# Patient Record
Sex: FEMALE | Race: WHITE | URBAN - METROPOLITAN AREA
[De-identification: names, ages, dates, MRNs, and addresses within clinical notes are randomized per-mention and may not be internally consistent; named-entity substitution may affect disease eponyms.]

---

## 2018-10-25 ENCOUNTER — APPOINTMENT (OUTPATIENT)
Dept: CT IMAGING | Facility: CLINIC | Age: 58
End: 2018-10-25
Attending: EMERGENCY MEDICINE
Payer: COMMERCIAL

## 2018-10-25 ENCOUNTER — HOSPITAL ENCOUNTER (OUTPATIENT)
Facility: CLINIC | Age: 58
Discharge: HOME | End: 2018-10-26
Attending: EMERGENCY MEDICINE | Admitting: SURGERY
Payer: COMMERCIAL

## 2018-10-25 ENCOUNTER — SURGERY (OUTPATIENT)
Age: 58
End: 2018-10-25

## 2018-10-25 ENCOUNTER — ANESTHESIA EVENT (OUTPATIENT)
Dept: SURGERY | Facility: CLINIC | Age: 58
End: 2018-10-25
Payer: COMMERCIAL

## 2018-10-25 ENCOUNTER — ANESTHESIA (OUTPATIENT)
Dept: SURGERY | Facility: CLINIC | Age: 58
End: 2018-10-25
Payer: COMMERCIAL

## 2018-10-25 DIAGNOSIS — K35.30 ACUTE APPENDICITIS WITH LOCALIZED PERITONITIS, WITHOUT PERFORATION, ABSCESS, OR GANGRENE: ICD-10-CM

## 2018-10-25 LAB
ALBUMIN SERPL-MCNC: 3.6 G/DL (ref 3.4–5)
ALBUMIN UR-MCNC: NEGATIVE MG/DL
ALP SERPL-CCNC: 69 U/L (ref 40–150)
ALT SERPL W P-5'-P-CCNC: 16 U/L (ref 0–50)
ANION GAP SERPL CALCULATED.3IONS-SCNC: 9 MMOL/L (ref 3–14)
APPEARANCE UR: CLEAR
AST SERPL W P-5'-P-CCNC: 11 U/L (ref 0–45)
BASOPHILS # BLD AUTO: 0 10E9/L (ref 0–0.2)
BASOPHILS NFR BLD AUTO: 0.2 %
BILIRUB SERPL-MCNC: 0.5 MG/DL (ref 0.2–1.3)
BILIRUB UR QL STRIP: NEGATIVE
BUN SERPL-MCNC: 10 MG/DL (ref 7–30)
CALCIUM SERPL-MCNC: 9.4 MG/DL (ref 8.5–10.1)
CHLORIDE SERPL-SCNC: 103 MMOL/L (ref 94–109)
CO2 SERPL-SCNC: 27 MMOL/L (ref 20–32)
COLOR UR AUTO: NORMAL
CREAT SERPL-MCNC: 0.62 MG/DL (ref 0.52–1.04)
DIFFERENTIAL METHOD BLD: ABNORMAL
EOSINOPHIL # BLD AUTO: 0 10E9/L (ref 0–0.7)
EOSINOPHIL NFR BLD AUTO: 0.2 %
ERYTHROCYTE [DISTWIDTH] IN BLOOD BY AUTOMATED COUNT: 15.6 % (ref 10–15)
GFR SERPL CREATININE-BSD FRML MDRD: >90 ML/MIN/1.7M2
GLUCOSE SERPL-MCNC: 95 MG/DL (ref 70–99)
GLUCOSE UR STRIP-MCNC: NEGATIVE MG/DL
HCT VFR BLD AUTO: 39.6 % (ref 35–47)
HGB BLD-MCNC: 12.7 G/DL (ref 11.7–15.7)
HGB UR QL STRIP: NEGATIVE
IMM GRANULOCYTES # BLD: 0 10E9/L (ref 0–0.4)
IMM GRANULOCYTES NFR BLD: 0.3 %
KETONES UR STRIP-MCNC: NEGATIVE MG/DL
LEUKOCYTE ESTERASE UR QL STRIP: NEGATIVE
LYMPHOCYTES # BLD AUTO: 1.3 10E9/L (ref 0.8–5.3)
LYMPHOCYTES NFR BLD AUTO: 13.6 %
MCH RBC QN AUTO: 26.9 PG (ref 26.5–33)
MCHC RBC AUTO-ENTMCNC: 32.1 G/DL (ref 31.5–36.5)
MCV RBC AUTO: 84 FL (ref 78–100)
MONOCYTES # BLD AUTO: 0.7 10E9/L (ref 0–1.3)
MONOCYTES NFR BLD AUTO: 7.6 %
NEUTROPHILS # BLD AUTO: 7.3 10E9/L (ref 1.6–8.3)
NEUTROPHILS NFR BLD AUTO: 78.1 %
NITRATE UR QL: NEGATIVE
NRBC # BLD AUTO: 0 10*3/UL
NRBC BLD AUTO-RTO: 0 /100
PH UR STRIP: 6.5 PH (ref 5–7)
PLATELET # BLD AUTO: 191 10E9/L (ref 150–450)
POTASSIUM SERPL-SCNC: 4 MMOL/L (ref 3.4–5.3)
PROT SERPL-MCNC: 8.4 G/DL (ref 6.8–8.8)
RBC # BLD AUTO: 4.72 10E12/L (ref 3.8–5.2)
SODIUM SERPL-SCNC: 139 MMOL/L (ref 133–144)
SOURCE: NORMAL
SP GR UR STRIP: 1.01 (ref 1–1.03)
UROBILINOGEN UR STRIP-MCNC: NORMAL MG/DL (ref 0–2)
WBC # BLD AUTO: 9.3 10E9/L (ref 4–11)

## 2018-10-25 PROCEDURE — 96375 TX/PRO/DX INJ NEW DRUG ADDON: CPT

## 2018-10-25 PROCEDURE — 85025 COMPLETE CBC W/AUTO DIFF WBC: CPT | Performed by: EMERGENCY MEDICINE

## 2018-10-25 PROCEDURE — 25000128 H RX IP 250 OP 636: Performed by: ANESTHESIOLOGY

## 2018-10-25 PROCEDURE — 25000125 ZZHC RX 250: Performed by: NURSE ANESTHETIST, CERTIFIED REGISTERED

## 2018-10-25 PROCEDURE — 37000009 ZZH ANESTHESIA TECHNICAL FEE, EACH ADDTL 15 MIN: Performed by: SURGERY

## 2018-10-25 PROCEDURE — 25000128 H RX IP 250 OP 636: Performed by: EMERGENCY MEDICINE

## 2018-10-25 PROCEDURE — 25800025 ZZH RX 258: Performed by: SURGERY

## 2018-10-25 PROCEDURE — 25000125 ZZHC RX 250: Performed by: SURGERY

## 2018-10-25 PROCEDURE — 74177 CT ABD & PELVIS W/CONTRAST: CPT

## 2018-10-25 PROCEDURE — 27210794 ZZH OR GENERAL SUPPLY STERILE: Performed by: SURGERY

## 2018-10-25 PROCEDURE — 25000132 ZZH RX MED GY IP 250 OP 250 PS 637: Performed by: PHYSICIAN ASSISTANT

## 2018-10-25 PROCEDURE — 40000935 ZZH STATISTIC OUTPATIENT (NON-OBS) EVE

## 2018-10-25 PROCEDURE — 36000056 ZZH SURGERY LEVEL 3 1ST 30 MIN: Performed by: SURGERY

## 2018-10-25 PROCEDURE — 25000566 ZZH SEVOFLURANE, EA 15 MIN: Performed by: SURGERY

## 2018-10-25 PROCEDURE — 25000128 H RX IP 250 OP 636: Performed by: NURSE ANESTHETIST, CERTIFIED REGISTERED

## 2018-10-25 PROCEDURE — 96361 HYDRATE IV INFUSION ADD-ON: CPT | Mod: 59

## 2018-10-25 PROCEDURE — 44970 LAPAROSCOPY APPENDECTOMY: CPT | Mod: AS | Performed by: PHYSICIAN ASSISTANT

## 2018-10-25 PROCEDURE — 25000125 ZZHC RX 250: Performed by: EMERGENCY MEDICINE

## 2018-10-25 PROCEDURE — 88304 TISSUE EXAM BY PATHOLOGIST: CPT | Mod: 26 | Performed by: SURGERY

## 2018-10-25 PROCEDURE — 88304 TISSUE EXAM BY PATHOLOGIST: CPT | Performed by: SURGERY

## 2018-10-25 PROCEDURE — 36000058 ZZH SURGERY LEVEL 3 EA 15 ADDTL MIN: Performed by: SURGERY

## 2018-10-25 PROCEDURE — 99203 OFFICE O/P NEW LOW 30 MIN: CPT | Mod: 57 | Performed by: SURGERY

## 2018-10-25 PROCEDURE — 40000936 ZZH STATISTIC OUTPATIENT (NON-OBS) NIGHT

## 2018-10-25 PROCEDURE — 99285 EMERGENCY DEPT VISIT HI MDM: CPT | Mod: 25

## 2018-10-25 PROCEDURE — 80053 COMPREHEN METABOLIC PANEL: CPT | Performed by: EMERGENCY MEDICINE

## 2018-10-25 PROCEDURE — 37000008 ZZH ANESTHESIA TECHNICAL FEE, 1ST 30 MIN: Performed by: SURGERY

## 2018-10-25 PROCEDURE — 96365 THER/PROPH/DIAG IV INF INIT: CPT

## 2018-10-25 PROCEDURE — 44970 LAPAROSCOPY APPENDECTOMY: CPT | Performed by: SURGERY

## 2018-10-25 PROCEDURE — 71000012 ZZH RECOVERY PHASE 1 LEVEL 1 FIRST HR: Performed by: SURGERY

## 2018-10-25 PROCEDURE — 96376 TX/PRO/DX INJ SAME DRUG ADON: CPT | Mod: 59

## 2018-10-25 PROCEDURE — 81003 URINALYSIS AUTO W/O SCOPE: CPT | Performed by: EMERGENCY MEDICINE

## 2018-10-25 PROCEDURE — 40000169 ZZH STATISTIC PRE-PROCEDURE ASSESSMENT I: Performed by: SURGERY

## 2018-10-25 PROCEDURE — 25000128 H RX IP 250 OP 636: Performed by: PHYSICIAN ASSISTANT

## 2018-10-25 RX ORDER — FENTANYL CITRATE 50 UG/ML
25-50 INJECTION, SOLUTION INTRAMUSCULAR; INTRAVENOUS
Status: DISCONTINUED | OUTPATIENT
Start: 2018-10-25 | End: 2018-10-25 | Stop reason: HOSPADM

## 2018-10-25 RX ORDER — LABETALOL HYDROCHLORIDE 5 MG/ML
20 INJECTION, SOLUTION INTRAVENOUS ONCE
Status: COMPLETED | OUTPATIENT
Start: 2018-10-25 | End: 2018-10-25

## 2018-10-25 RX ORDER — FENTANYL CITRATE 50 UG/ML
INJECTION, SOLUTION INTRAMUSCULAR; INTRAVENOUS PRN
Status: DISCONTINUED | OUTPATIENT
Start: 2018-10-25 | End: 2018-10-25

## 2018-10-25 RX ORDER — NEOSTIGMINE METHYLSULFATE 1 MG/ML
VIAL (ML) INJECTION PRN
Status: DISCONTINUED | OUTPATIENT
Start: 2018-10-25 | End: 2018-10-25

## 2018-10-25 RX ORDER — PROCHLORPERAZINE MALEATE 10 MG
10 TABLET ORAL EVERY 6 HOURS PRN
Status: DISCONTINUED | OUTPATIENT
Start: 2018-10-25 | End: 2018-10-26 | Stop reason: HOSPADM

## 2018-10-25 RX ORDER — DIVALPROEX SODIUM 500 MG/1
1000 TABLET, EXTENDED RELEASE ORAL AT BEDTIME
Status: DISCONTINUED | OUTPATIENT
Start: 2018-10-25 | End: 2018-10-26 | Stop reason: HOSPADM

## 2018-10-25 RX ORDER — LORATADINE 10 MG/1
10 TABLET ORAL EVERY MORNING
COMMUNITY
End: 2023-07-07

## 2018-10-25 RX ORDER — DEXAMETHASONE SODIUM PHOSPHATE 4 MG/ML
INJECTION, SOLUTION INTRA-ARTICULAR; INTRALESIONAL; INTRAMUSCULAR; INTRAVENOUS; SOFT TISSUE PRN
Status: DISCONTINUED | OUTPATIENT
Start: 2018-10-25 | End: 2018-10-25

## 2018-10-25 RX ORDER — PIPERACILLIN SODIUM, TAZOBACTAM SODIUM 3; .375 G/15ML; G/15ML
3.38 INJECTION, POWDER, LYOPHILIZED, FOR SOLUTION INTRAVENOUS ONCE
Status: COMPLETED | OUTPATIENT
Start: 2018-10-25 | End: 2018-10-25

## 2018-10-25 RX ORDER — IOPAMIDOL 755 MG/ML
135 INJECTION, SOLUTION INTRAVASCULAR ONCE
Status: COMPLETED | OUTPATIENT
Start: 2018-10-25 | End: 2018-10-25

## 2018-10-25 RX ORDER — HYDROMORPHONE HYDROCHLORIDE 1 MG/ML
0.2 INJECTION, SOLUTION INTRAMUSCULAR; INTRAVENOUS; SUBCUTANEOUS
Status: DISCONTINUED | OUTPATIENT
Start: 2018-10-25 | End: 2018-10-26 | Stop reason: HOSPADM

## 2018-10-25 RX ORDER — GLYCOPYRROLATE 0.2 MG/ML
INJECTION, SOLUTION INTRAMUSCULAR; INTRAVENOUS PRN
Status: DISCONTINUED | OUTPATIENT
Start: 2018-10-25 | End: 2018-10-25

## 2018-10-25 RX ORDER — OXYCODONE HYDROCHLORIDE 5 MG/1
5-10 TABLET ORAL
Status: DISCONTINUED | OUTPATIENT
Start: 2018-10-25 | End: 2018-10-26 | Stop reason: HOSPADM

## 2018-10-25 RX ORDER — TRAZODONE HYDROCHLORIDE 50 MG/1
50 TABLET, FILM COATED ORAL AT BEDTIME
COMMUNITY

## 2018-10-25 RX ORDER — DIVALPROEX SODIUM 500 MG/1
1000 TABLET, EXTENDED RELEASE ORAL AT BEDTIME
COMMUNITY

## 2018-10-25 RX ORDER — SODIUM CHLORIDE, SODIUM LACTATE, POTASSIUM CHLORIDE, CALCIUM CHLORIDE 600; 310; 30; 20 MG/100ML; MG/100ML; MG/100ML; MG/100ML
INJECTION, SOLUTION INTRAVENOUS CONTINUOUS
Status: DISCONTINUED | OUTPATIENT
Start: 2018-10-25 | End: 2018-10-25 | Stop reason: HOSPADM

## 2018-10-25 RX ORDER — ONDANSETRON 2 MG/ML
4 INJECTION INTRAMUSCULAR; INTRAVENOUS EVERY 30 MIN PRN
Status: DISCONTINUED | OUTPATIENT
Start: 2018-10-25 | End: 2018-10-25

## 2018-10-25 RX ORDER — SODIUM CHLORIDE 9 MG/ML
INJECTION, SOLUTION INTRAVENOUS CONTINUOUS
Status: DISCONTINUED | OUTPATIENT
Start: 2018-10-25 | End: 2018-10-25 | Stop reason: CLARIF

## 2018-10-25 RX ORDER — SERTRALINE HYDROCHLORIDE 100 MG/1
200 TABLET, FILM COATED ORAL DAILY
COMMUNITY

## 2018-10-25 RX ORDER — HYDROMORPHONE HYDROCHLORIDE 1 MG/ML
0.5 INJECTION, SOLUTION INTRAMUSCULAR; INTRAVENOUS; SUBCUTANEOUS
Status: COMPLETED | OUTPATIENT
Start: 2018-10-25 | End: 2018-10-25

## 2018-10-25 RX ORDER — HYDROXYZINE HYDROCHLORIDE 25 MG/1
25 TABLET, FILM COATED ORAL EVERY 6 HOURS PRN
Status: DISCONTINUED | OUTPATIENT
Start: 2018-10-25 | End: 2018-10-26 | Stop reason: HOSPADM

## 2018-10-25 RX ORDER — ONDANSETRON 2 MG/ML
INJECTION INTRAMUSCULAR; INTRAVENOUS PRN
Status: DISCONTINUED | OUTPATIENT
Start: 2018-10-25 | End: 2018-10-25

## 2018-10-25 RX ORDER — ONDANSETRON 4 MG/1
4 TABLET, ORALLY DISINTEGRATING ORAL EVERY 30 MIN PRN
Status: DISCONTINUED | OUTPATIENT
Start: 2018-10-25 | End: 2018-10-25 | Stop reason: HOSPADM

## 2018-10-25 RX ORDER — TRAZODONE HYDROCHLORIDE 50 MG/1
50 TABLET, FILM COATED ORAL AT BEDTIME
Status: DISCONTINUED | OUTPATIENT
Start: 2018-10-25 | End: 2018-10-26 | Stop reason: HOSPADM

## 2018-10-25 RX ORDER — NALOXONE HYDROCHLORIDE 0.4 MG/ML
.1-.4 INJECTION, SOLUTION INTRAMUSCULAR; INTRAVENOUS; SUBCUTANEOUS
Status: CANCELLED | OUTPATIENT
Start: 2018-10-25 | End: 2018-10-26

## 2018-10-25 RX ORDER — OXYCODONE HYDROCHLORIDE 5 MG/1
5-10 TABLET ORAL EVERY 6 HOURS PRN
Qty: 15 TABLET | Refills: 0 | Status: SHIPPED | OUTPATIENT
Start: 2018-10-25 | End: 2023-07-07

## 2018-10-25 RX ORDER — ONDANSETRON 4 MG/1
4 TABLET, ORALLY DISINTEGRATING ORAL EVERY 6 HOURS PRN
Status: DISCONTINUED | OUTPATIENT
Start: 2018-10-25 | End: 2018-10-26 | Stop reason: HOSPADM

## 2018-10-25 RX ORDER — HYDROMORPHONE HYDROCHLORIDE 1 MG/ML
.3-.5 INJECTION, SOLUTION INTRAMUSCULAR; INTRAVENOUS; SUBCUTANEOUS EVERY 5 MIN PRN
Status: DISCONTINUED | OUTPATIENT
Start: 2018-10-25 | End: 2018-10-25 | Stop reason: HOSPADM

## 2018-10-25 RX ORDER — LIDOCAINE 40 MG/G
CREAM TOPICAL
Status: DISCONTINUED | OUTPATIENT
Start: 2018-10-25 | End: 2018-10-26 | Stop reason: HOSPADM

## 2018-10-25 RX ORDER — ONDANSETRON 2 MG/ML
4 INJECTION INTRAMUSCULAR; INTRAVENOUS EVERY 6 HOURS PRN
Status: DISCONTINUED | OUTPATIENT
Start: 2018-10-25 | End: 2018-10-26 | Stop reason: HOSPADM

## 2018-10-25 RX ORDER — NALOXONE HYDROCHLORIDE 0.4 MG/ML
.1-.4 INJECTION, SOLUTION INTRAMUSCULAR; INTRAVENOUS; SUBCUTANEOUS
Status: DISCONTINUED | OUTPATIENT
Start: 2018-10-25 | End: 2018-10-26 | Stop reason: HOSPADM

## 2018-10-25 RX ORDER — MAGNESIUM HYDROXIDE 1200 MG/15ML
LIQUID ORAL PRN
Status: DISCONTINUED | OUTPATIENT
Start: 2018-10-25 | End: 2018-10-25 | Stop reason: HOSPADM

## 2018-10-25 RX ORDER — DIVALPROEX SODIUM 125 MG/1
125 TABLET, DELAYED RELEASE ORAL 3 TIMES DAILY
COMMUNITY
End: 2018-10-25

## 2018-10-25 RX ORDER — ONDANSETRON 2 MG/ML
4 INJECTION INTRAMUSCULAR; INTRAVENOUS EVERY 30 MIN PRN
Status: DISCONTINUED | OUTPATIENT
Start: 2018-10-25 | End: 2018-10-25 | Stop reason: HOSPADM

## 2018-10-25 RX ADMIN — ROCURONIUM BROMIDE 20 MG: 10 INJECTION INTRAVENOUS at 14:32

## 2018-10-25 RX ADMIN — WATER 1000 ML: 100 IRRIGANT IRRIGATION at 14:48

## 2018-10-25 RX ADMIN — ONDANSETRON 4 MG: 2 INJECTION INTRAMUSCULAR; INTRAVENOUS at 09:33

## 2018-10-25 RX ADMIN — SODIUM CHLORIDE: 9 INJECTION, SOLUTION INTRAVENOUS at 17:23

## 2018-10-25 RX ADMIN — HYDROMORPHONE HYDROCHLORIDE 0.25 MG: 1 INJECTION, SOLUTION INTRAMUSCULAR; INTRAVENOUS; SUBCUTANEOUS at 14:37

## 2018-10-25 RX ADMIN — SODIUM CHLORIDE, PRESERVATIVE FREE 80 ML: 5 INJECTION INTRAVENOUS at 11:32

## 2018-10-25 RX ADMIN — LABETALOL HYDROCHLORIDE 20 MG: 5 INJECTION INTRAVENOUS at 15:55

## 2018-10-25 RX ADMIN — ROCURONIUM BROMIDE 30 MG: 10 INJECTION INTRAVENOUS at 14:16

## 2018-10-25 RX ADMIN — DEXMEDETOMIDINE HYDROCHLORIDE 8 MCG: 100 INJECTION, SOLUTION INTRAVENOUS at 14:37

## 2018-10-25 RX ADMIN — SODIUM CHLORIDE, POTASSIUM CHLORIDE, SODIUM LACTATE AND CALCIUM CHLORIDE: 600; 310; 30; 20 INJECTION, SOLUTION INTRAVENOUS at 14:10

## 2018-10-25 RX ADMIN — DEXMEDETOMIDINE HYDROCHLORIDE 8 MCG: 100 INJECTION, SOLUTION INTRAVENOUS at 14:31

## 2018-10-25 RX ADMIN — FENTANYL CITRATE 100 MCG: 50 INJECTION, SOLUTION INTRAMUSCULAR; INTRAVENOUS at 14:15

## 2018-10-25 RX ADMIN — MIDAZOLAM 2 MG: 1 INJECTION INTRAMUSCULAR; INTRAVENOUS at 14:11

## 2018-10-25 RX ADMIN — SODIUM CHLORIDE 1000 ML: 900 IRRIGANT IRRIGATION at 14:47

## 2018-10-25 RX ADMIN — HYDROMORPHONE HYDROCHLORIDE 0.25 MG: 1 INJECTION, SOLUTION INTRAMUSCULAR; INTRAVENOUS; SUBCUTANEOUS at 14:46

## 2018-10-25 RX ADMIN — SODIUM CHLORIDE 1000 ML: 9 INJECTION, SOLUTION INTRAVENOUS at 09:36

## 2018-10-25 RX ADMIN — PIPERACILLIN SODIUM, TAZOBACTAM SODIUM 3.38 G: 3; .375 INJECTION, POWDER, LYOPHILIZED, FOR SOLUTION INTRAVENOUS at 12:24

## 2018-10-25 RX ADMIN — FENTANYL CITRATE 100 MCG: 50 INJECTION, SOLUTION INTRAMUSCULAR; INTRAVENOUS at 14:13

## 2018-10-25 RX ADMIN — DEXAMETHASONE SODIUM PHOSPHATE 10 MG: 4 INJECTION, SOLUTION INTRA-ARTICULAR; INTRALESIONAL; INTRAMUSCULAR; INTRAVENOUS; SOFT TISSUE at 14:30

## 2018-10-25 RX ADMIN — IOPAMIDOL 135 ML: 755 INJECTION, SOLUTION INTRAVENOUS at 11:32

## 2018-10-25 RX ADMIN — ONDANSETRON 4 MG: 2 INJECTION INTRAMUSCULAR; INTRAVENOUS at 14:30

## 2018-10-25 RX ADMIN — BUPIVACAINE HYDROCHLORIDE AND EPINEPHRINE BITARTRATE 38 ML: 5; .005 INJECTION, SOLUTION EPIDURAL; INTRACAUDAL; PERINEURAL at 15:03

## 2018-10-25 RX ADMIN — Medication 0.5 MG: at 12:22

## 2018-10-25 RX ADMIN — GLYCOPYRROLATE 0.8 MG: 0.2 INJECTION, SOLUTION INTRAMUSCULAR; INTRAVENOUS at 15:12

## 2018-10-25 RX ADMIN — NEOSTIGMINE METHYLSULFATE 5 MG: 1 INJECTION, SOLUTION INTRAVENOUS at 15:12

## 2018-10-25 RX ADMIN — Medication 0.5 MG: at 09:34

## 2018-10-25 RX ADMIN — DIVALPROEX SODIUM 1000 MG: 500 TABLET, EXTENDED RELEASE ORAL at 21:12

## 2018-10-25 RX ADMIN — DEXMEDETOMIDINE HYDROCHLORIDE 4 MCG: 100 INJECTION, SOLUTION INTRAVENOUS at 14:45

## 2018-10-25 ASSESSMENT — ENCOUNTER SYMPTOMS
ABDOMINAL DISTENTION: 1
DIARRHEA: 0
FEVER: 0
VOMITING: 0
FREQUENCY: 0
CONSTIPATION: 0
NAUSEA: 1
ABDOMINAL PAIN: 1
DYSURIA: 0

## 2018-10-25 ASSESSMENT — ACTIVITIES OF DAILY LIVING (ADL)
TOILETING: 0-->INDEPENDENT
RETIRED_EATING: 0-->INDEPENDENT
COGNITION: 0 - NO COGNITION ISSUES REPORTED
BATHING: 0-->INDEPENDENT
SWALLOWING: 0-->SWALLOWS FOODS/LIQUIDS WITHOUT DIFFICULTY
FALL_HISTORY_WITHIN_LAST_SIX_MONTHS: NO
AMBULATION: 0-->INDEPENDENT
DRESS: 0-->INDEPENDENT
RETIRED_COMMUNICATION: 0-->UNDERSTANDS/COMMUNICATES WITHOUT DIFFICULTY
TRANSFERRING: 0-->INDEPENDENT

## 2018-10-25 ASSESSMENT — LIFESTYLE VARIABLES: TOBACCO_USE: 0

## 2018-10-25 NOTE — OP NOTE
General Surgery Operative Note    PREOPERATIVE DIAGNOSIS:  ACUTE APPENDICITIS.    POSTOPERATIVE DIAGNOSIS:  Acute appendicitis with localized peritonitis    PROCEDURE:   Procedure(s):  LAPAROSCOPIC APPENDECTOMY    ANESTHESIA:  General.    PREOPERATIVE MEDICATIONS:  Zosyn IV.    SURGEON:  Danilo Jennings MD    ASSISTANT:  Timoteo Sandoval PA-C.  First assistant was necessary due to challenging exposure and the need for improved visualization and help maintaining hemostasis.    INDICATIONS:  Appendicitis    PROCEDURE:  Patient was taken to the operating suite and uneventfully endotracheally intubated.  Abdomen was prepped and draped in a sterile fashion.  Surgeon initiated timeout was acknowledged.  A small skin incision was made left upper quadrant.  We entered the abdomen using the Visiport technique.  Two other trocars were placed in the usual positions under laparoscopic visualization.  Using 2 long graspers, we were able to identify the cecum and the appendix was identified near the ileocecal valve.  It was in a retrocecal position.  The appendix was inflamed and hyperemic but not ruptured.   We were able to grasp the appendix and elevate it up toward the anterior abdominal wall.  Using a combination of sharp and blunt dissection, we were able to create a window between the appendix and the mesoappendix near its base.  We then fired a 45 mm blue load Endo-TONJA stapler across the appendix at its base.  This appeared to be intact.  Following this, we used the ligasure device across the mesoappendix, freeing the appendix from the cecum.  Appendix was placed in an Endocatch bag and removed from the abdomen.  We again inspected our staple lines and these were all found to be intact and hemostatic.  I removed the umbilical port trocar and reapproximated the fascia with a figure-of-eight 0 Vicryl suture using the Osmany-Amy device.  We then desufflated the abdomen using the Logan suction  and the trocars were  removed.  The skin edges were reapproximated using 4-0 Vicryl and Steri-Strips.  Band-Aids were placed and the patient was awakened.  The patient was uneventfully extubated and taken to PACU in stable condition.  At the conclusion of the case, all lap and needle counts were correct.      ESTIMATED BLOOD LOSS:  10 mL    INTRAOPERATIVE FINDINGS:  Acute appendicitis with localized peritonitis    Danilo Jennings MD

## 2018-10-25 NOTE — IP AVS SNAPSHOT
MRN:[id number]                      After Visit Summary   10/25/2018    Sherrie Swanson Behboudi    MRN: [id number]           Thank you!     Thank you for choosing [facility] for your care. Our goal is always to provide you with excellent care. Hearing back from our patients is one way we can continue to improve our services. Please take a few minutes to complete the written survey that you may receive in the mail after you visit with us. Thank you!        Patient Information     Date Of Birth          1960        Designated Caregiver       Most Recent Value    Caregiver    Will someone help with your care after discharge? yes    Name of designated caregiver Lopez    Phone number of caregiver [phone]    Caregiver address Same as pt      About your hospital stay     You were admitted on:  October 25, 2018 You last received care in the:  [facility] Observation Unit    You were discharged on:  October 26, 2018       Who to Call     For medical emergencies, please call 911.  For non-urgent questions about your medical care, please call your primary care provider or clinic, [phone]  For questions related to your surgery, please call your surgery clinic        Attending Provider     Provider Specialty    Iva Pepper MD Emergency Medicine    Danilo Jennings MD Surgery       Primary Care Provider Office Phone # Fax #    [facility] [phone]      Pending Results     Date and Time Order Name Status Description    10/25/2018 1456 Surgical pathology exam In process             Statement of Approval     Ordered          10/26/18 1306  I have reviewed and agree with all the recommendations and orders detailed in this document.  EFFECTIVE NOW     Approved and electronically signed by:  Timoteo Sandoval PA-C             Admission Information     Date & Time Provider Department Dept. Phone    10/25/2018 Danilo Jennings MD [facility]  "Unit [phone]      Your Vitals Were     Blood Pressure Pulse Temperature Respirations Height Weight    140/65 (BP Location: Right arm) 83 98  F (36.7  C) (Oral) 15 1.737 m (5' 8.4\") 138.8 kg (306 lb)    Pulse Oximetry BMI (Body Mass Index)                94% 45.98 kg/m2          MyChart Information     [facility] lets you send messages to your doctor, view your test results, renew your prescriptions, schedule appointments and more. To sign up, go to [email or web address] . Click on \"Log in\" on the left side of the screen, which will take you to the Welcome page. Then click on \"Sign up Now\" on the right side of the page.     You will be asked to enter the access code listed below, as well as some personal information. Please follow the directions to create your username and password.     Your access code is: [email or web address]  Expires: 2019  1:19 PM     Your access code will  in 90 days. If you need help or a new code, please call your [facility] clinic or [phone].        Care EveryWhere ID     This is your Care EveryWhere ID. This could be used by other organizations to access your [facility] medical records  [id number]        Equal Access to Services     BERNADETTE DOMINIQUE AH: Sandy howeo Sotere, waaxda luqadaha, qaybta kaalmada adeegyada, thelma pichardo. So [facility] [phone].    ATENCIÓN: Si habla español, tiene a desouza disposición servicios gratuitos de asistencia lingüística. Llame al [phone].    We comply with applicable federal civil rights laws and [place] laws. We do not discriminate on the basis of race, color, national origin, age, disability, sex, sexual orientation, or gender identity.               Review of your medicines      START taking        Dose / Directions    ondansetron 4 MG ODT tab   Commonly known as:  ZOFRAN-ODT   Used for:  Acute appendicitis with localized peritonitis, without perforation, abscess, or gangrene        Dose:  4 mg   Take 1 " tablet (4 mg) by mouth every 6 hours as needed for nausea or vomiting   Quantity:  20 tablet   Refills:  0       oxyCODONE IR 5 MG tablet   Commonly known as:  ROXICODONE   Used for:  Acute appendicitis with localized peritonitis, without perforation, abscess, or gangrene   Notes to Patient:  Available anytime after 3pm on 10/26/18        Dose:  5-10 mg   Take 1-2 tablets (5-10 mg) by mouth every 6 hours as needed for pain (Moderate to Severe)   Quantity:  15 tablet   Refills:  0         CONTINUE these medicines which have NOT CHANGED        Dose / Directions    divalproex sodium extended-release 500 MG 24 hr tablet   Commonly known as:  DEPAKOTE ER        Dose:  1000 mg   Take 1,000 mg by mouth At Bedtime   Refills:  0       loratadine 10 MG tablet   Commonly known as:  CLARITIN   Indication:  Hayfever   Notes to Patient:  Resume per pt        Dose:  10 mg   Take 10 mg by mouth every morning   Refills:  0       sertraline 100 MG tablet   Commonly known as:  ZOLOFT        Dose:  200 mg   Take 200 mg by mouth daily   Refills:  0       traZODone 50 MG tablet   Commonly known as:  DESYREL        Dose:  50 mg   Take 50 mg by mouth At Bedtime   Refills:  0            Where to get your medicines      These medications were sent to [facility] [place], [place] - [address]  [id number] [place] [address]     Phone:  [phone]     ondansetron 4 MG ODT tab         Some of these will need a paper prescription and others can be bought over the counter. Ask your nurse if you have questions.     Bring a paper prescription for each of these medications     oxyCODONE IR 5 MG tablet                Protect others around you: Learn how to safely use, store and throw away your medicines at www.disposemymeds.org.        Information about OPIOIDS     PRESCRIPTION OPIOIDS: WHAT YOU NEED TO KNOW   We gave you an opioid (narcotic) pain medicine. It is important to manage your pain, but opioids are  not always the best choice. You should first try all the other options your care team gave you. Take this medicine for as short a time (and as few doses) as possible.    Some activities can increase your pain, such as bandage changes or therapy sessions. It may help to take your pain medicine 30 to 60 minutes before these activities. Reduce your stress by getting enough sleep, working on hobbies you enjoy and practicing relaxation or meditation. Talk to your care team about ways to manage your pain beyond prescription opioids.    These medicines have risks:    DO NOT drive when on new or higher doses of pain medicine. These medicines can affect your alertness and reaction times, and you could be arrested for driving under the influence (DUI). If you need to use opioids long-term, talk to your care team about driving.    DO NOT operate heavy machinery    DO NOT do any other dangerous activities while taking these medicines.    DO NOT drink any alcohol while taking these medicines.     If the opioid prescribed includes acetaminophen, DO NOT take with any other medicines that contain acetaminophen. Read all labels carefully. Look for the word  acetaminophen  or  Tylenol.  Ask your pharmacist if you have questions or are unsure.    You can get addicted to pain medicines, especially if you have a history of addiction (chemical, alcohol or substance dependence). Talk to your care team about ways to reduce this risk.    All opioids tend to cause constipation. Drink plenty of water and eat foods that have a lot of fiber, such as fruits, vegetables, prune juice, apple juice and high-fiber cereal. Take a laxative (Miralax, milk of magnesia, Colace, Senna) if you don t move your bowels at least every other day. Other side effects include upset stomach, sleepiness, dizziness, throwing up, tolerance (needing more of the medicine to have the same effect), physical dependence and slowed breathing.    Store your pills in a secure  place, locked if possible. We will not replace any lost or stolen medicine. If you don t finish your medicine, please throw away (dispose) as directed by your pharmacist. The [place] Pollution Control Agency has more information about safe disposal: https://www.pca.[facility].[place].us/living-green/managing-unwanted-medications             Medication List: This is a list of all your medications and when to take them. Check marks below indicate your daily home schedule. Keep this list as a reference.      Medications           Morning Afternoon Evening Bedtime As Needed    divalproex sodium extended-release 500 MG 24 hr tablet   Commonly known as:  DEPAKOTE ER   Take 1,000 mg by mouth At Bedtime   Last time this was given:  1,000 mg on 10/25/2018  9:12 PM                        10/26/18           loratadine 10 MG tablet   Commonly known as:  CLARITIN   Take 10 mg by mouth every morning   Notes to Patient:  Resume per pt                                ondansetron 4 MG ODT tab   Commonly known as:  ZOFRAN-ODT   Take 1 tablet (4 mg) by mouth every 6 hours as needed for nausea or vomiting                                   oxyCODONE IR 5 MG tablet   Commonly known as:  ROXICODONE   Take 1-2 tablets (5-10 mg) by mouth every 6 hours as needed for pain (Moderate to Severe)   Last time this was given:  5 mg on 10/26/2018  8:54 AM   Notes to Patient:  Available anytime after 3pm on 10/26/18                            Available anytime after 3 pm on 10/26/18       sertraline 100 MG tablet   Commonly known as:  ZOLOFT   Take 200 mg by mouth daily   Last time this was given:  200 mg on 10/26/2018  7:56 AM            10/27/18                       traZODone 50 MG tablet   Commonly known as:  DESYREL   Take 50 mg by mouth At Bedtime   Last time this was given:  50 mg on 10/26/2018  2:23 AM                        10/26/18

## 2018-10-25 NOTE — ANESTHESIA CARE TRANSFER NOTE
Patient: Sherrie Swanson Behboudi    Procedure(s):  LAPAROSCOPIC APPENDECTOMY    Diagnosis: ACUTE APPENDICITIS.  Diagnosis Additional Information: No value filed.    Anesthesia Type:   General, RSI, ETT     Note:  Airway :Face Mask  Patient transferred to:PACU  Comments: Pt to PACU on O2 via mask, airway patent, VSS.  Report to RN.Handoff Report: Identifed the Patient, Identified the Reponsible Provider, Reviewed the pertinent medical history, Discussed the surgical course, Reviewed Intra-OP anesthesia mangement and issues during anesthesia, Set expectations for post-procedure period and Allowed opportunity for questions and acknowledgement of understanding      Vitals: (Last set prior to Anesthesia Care Transfer)    CRNA VITALS  10/25/2018 1452 - 10/25/2018 1529      10/25/2018             Pulse: 97    SpO2: 94 %    Resp Rate (set): 10                Electronically Signed By: MILLIE Enamorado CRNA  October 25, 2018  3:29 PM

## 2018-10-25 NOTE — IP AVS SNAPSHOT
[facility] Observation Unit    [address] [place] [id number]    Phone:  [phone]                                       After Visit Summary   10/25/2018    Sherrie Swanson Behboudi    MRN: [id number]           After Visit Summary Signature Page     I have received my discharge instructions, and my questions have been answered. I have discussed any challenges I see with this plan with the nurse or doctor.    ..........................................................................................................................................  Patient/Patient Representative Signature      ..........................................................................................................................................  Patient Representative Print Name and Relationship to Patient    ..................................................               ................................................  Date                                   Time    ..........................................................................................................................................  Reviewed by Signature/Title    ...................................................              ..............................................  Date                                               Time          22EPIC Rev 08/18

## 2018-10-25 NOTE — H&P
"[facility] - H&P     Sherrie Swanson Behboudi MRN# [id number]   Age: 58 year old YOB: 1960     HPI:  Patient has been experiencing acute suprapubic and RLQ abdominal pain for the past 4 days associated with nausea and anorexia.  These symptoms have been increasing in severity.  Pain originally started on Friday and was intermittent.  It extend across her lower abdomen.  Yesterday the pain became much more severe and was localized to the right lower quadrant.  This progressed overnight and the patient had very severe pain this morning and presented to the emergency department.  CT scan was ordered and this showed inflammatory changes in the right lower quadrant and an enlarged thickened appendix.  No obvious signs of abscess or appendix rupture.  Patient has no history of previous abdominal surgeries.  No loose stools or madiha emesis.  We are asked to help evaluate.    History is obtained from the patient    Review Of Systems:  Respiratory: No shortness of breath, dyspnea on exertion, cough, or hemoptysis  Cardiovascular: negative  Gastrointestinal: as above  Genitourinary: negative    PMH:  History reviewed. No pertinent past medical history.    PSH:  History reviewed. No pertinent surgical history.    Allergies:  Allergies   Allergen Reactions     Dust Mites      Penicillins Unknown       Home Medications:  No current outpatient prescriptions on file.       Social History:  Social History   Substance Use Topics     Smoking status: Never Smoker     Smokeless tobacco: Never Used     Alcohol use Yes       Family History:  No family history chronic diarrhea, inflammatory bowel disease or colon cancer.    Objective:  /75  Pulse 83  Temp 99.1  F (37.3  C) (Oral)  Resp 18  Ht 1.737 m (5' 8.4\")  Wt 138.8 kg (306 lb)  SpO2 96%  BMI 45.98 kg/m2    General appearance: healthy, alert and mild distress  Hydration: mildly dehydrated  Neck: normal, supple and no " adenopathy  Lungs: normal and clear to auscultation  Heart: regular rate and rhythm and no murmurs, clicks, or gallops  Abdomen: obese, hypoactive bowel sounds.   Tenderness: present: RLQ moderate  Masses: none  Organomegaly: none    Labs Reviewed:  Recent Labs      10/25/18   0920   HGB  12.7   WBC  9.3       Radiology:  CT abdomen reveals a dilated, thick-walled appendix with surrounding fat stranding.  Some surrounding fluid.  No free air or evidence for rupture.  All imaging studies reviewed by me.    ASSESSMENT/PLAN:  The patient's history, physical exam, laboratory and imaging studies are suspicious for acute appendicitis.  I have offered the patient laparoscopic appendectomy.  The risks, benefits, and alternatives have been discussed in detail.  All of the patient's questions have been answered.  They elect to proceed and we will go to the OR at the soonest availability.  Pre-operative antibiotics have been ordered.     Danilo Jennings MD

## 2018-10-25 NOTE — ED PROVIDER NOTES
"  History     Chief Complaint:  Abdominal Pain    HPI   Sherrie Swanson Behboudi is a 58 year old female who presents for evaluation of abdominal pain. Her pain began 6 days ago, and was reported to be sporadic shooting pain. She was not doing any physically straining activity involving her abdominal muscles. Yesterday, the pain became worse, and she now feels that she is bloated, and has sharp pain with movement. She states she has been unable to sleep laying down and has been sleeping upright in a chair. She is nauseous, but has not been vomiting. Denies dysuria, or other changes in urination. No fevers. Denies any problems with her BM's. No recent abdominal surgeries. Found a few polyps on her recent colonoscopy. Her last meal was yesterday, and some water this morning with her medications.    Allergies:  Dust Mites    Medications:    Robitussin  Sertraline     Past Medical History:    The patient denies any significant past medical history.     Past Surgical History:    The patient does not have any pertinent past surgical history.     Family History:    No past pertinent family history.     Social History:  Relationship status:   Tobacco use: No  Alcohol use: Yes  The patient presents with family.    Marital Status:   [2]     Review of Systems   Constitutional: Negative for fever.   Gastrointestinal: Positive for abdominal distention, abdominal pain and nausea. Negative for constipation, diarrhea and vomiting.   Genitourinary: Negative for decreased urine volume, dysuria, frequency and urgency.   All other systems reviewed and are negative.    Physical Exam     Patient Vitals for the past 24 hrs:   BP Temp Temp src Pulse Resp SpO2 Height Weight   10/25/18 1100 152/72 - - - - 98 % - -   10/25/18 1030 151/69 - - 100 16 100 % - -   10/25/18 1000 148/63 - - 99 18 99 % - -   10/25/18 0926 151/77 99.1  F (37.3  C) Oral 83 16 97 % 1.737 m (5' 8.4\") 138.8 kg (306 lb)        Physical Exam  General: Patient " is alert and uncomfortable appearing.  HEENT: Head atraumatic    Eyes: pupils equal and reactive. Conjunctiva clear   Nares: patent   Oropharynx: no lesions, uvula midline, no palatal draping, normal voice, no trismus  Neck: Supple without lymphadenopathy, no meningismus  Chest: Heart regular rate and rhythm.   Lungs: Equal clear to auscultation with no wheeze or rales  Abdomen: Soft, tender greatest in the right lower quadrant and also periumbilically.  Not distended.  Positive rebound, positive guarding.  Back: No costovertebral angle tenderness, no midline C, T or L spine tenderness  Neuro: Grossly nonfocal, normal speech, strength equal bilaterally, CN 2-12 intact  Extremities: No deformities, equal radial and DP pulses. No clubbing, cyanosis.  No edema  Skin: Warm and dry with no rash.       Emergency Department Course     Imaging:  Radiology findings were communicated with the patient who voiced understanding of the findings.  CT Abdomen Pelvis w Contrast  IMPRESSION: Acute uncomplicated appendicitis. Recommend surgical  consult.  Reading per radiology.     Laboratory:  Laboratory findings were communicated with the patient who voiced understanding of the findings.  CBC: RDW: 15.6(H) o/w WNL (WBC 9.3, HGB 12.7, )  CMP: AWNL (Creatinine 0.62)   UA reflex to microscopic and culture: AWNL    Interventions:  0933 Zofran 4 mg IV  0934 Dilaudid 0.5 mg IV  0936 Normal Saline 1000 mL IV   1222 Dilaudid 0.5 mg IV  1224 Zosyn 3.375 g IV  Medications   ondansetron (ZOFRAN) injection 4 mg (4 mg Intravenous Given 10/25/18 0933)   piperacillin-tazobactam (ZOSYN) 3.375 g vial to attach to  mL bag (3.375 g Intravenous New Bag 10/25/18 1224)   0.9% sodium chloride BOLUS (0 mLs Intravenous Stopped 10/25/18 1132)   HYDROmorphone (PF) (DILAUDID) injection 0.5 mg (0.5 mg Intravenous Given 10/25/18 0934)   iopamidol (ISOVUE-370) solution 135 mL (135 mLs Intravenous Given 10/25/18 1132)   Saline (80 mLs As instructed  Given 10/25/18 1132)   HYDROmorphone (PF) (DILAUDID) injection 0.5 mg (0.5 mg Intravenous Given 10/25/18 1222)      Emergency Department Course:  Nursing notes and vitals reviewed. IV was inserted and blood was drawn for laboratory testing, results above. The patient was sent for a CT while in the emergency department, results above.    0919: I performed an exam of the patient as documented above.     I discussed the treatment plan with the patient. They expressed understanding of this plan and consented to admission. I discussed the patient with Dr. Jennings, who will admit the patient to the OR for further treatment.     I personally reviewed the laboratory results with the patient and answered all related questions prior to admission.    Impression & Plan      Medical Decision Making:  Sherrie Swanson Behboudi is a 58 year old female who presents with abdominal pain and the CT scan confirms appendicitis.  There is no evidence of rupture or abscess at this time. Pain has been controlled with interventions in the Emergency Department.  Parenteral antibiotics have been ordered and given in the Emergency Department. The case was discussed with the on-call surgeon and the patient will be going to the operating room.  An observation bed has been arranged for after surgery.  Patient is hemodynamically stable in ED.  Questions were answered.      Diagnosis:    ICD-10-CM    1. Acute appendicitis with localized peritonitis, without perforation, abscess, or gangrene K35.30       Disposition:   Admitted to OR    CMS Diagnoses: None     Scribe Disclosure:  ISujey, am serving as a scribe at 9:13 AM on 10/25/2018 to document services personally performed by Iva Pepper MD, based on my observations and the provider's statements to me.     Sujey Frazier  10/25/2018    EMERGENCY DEPARTMENT       Iva Pepper MD  10/25/18 1244       Iva Pepper MD  10/25/18 1252

## 2018-10-25 NOTE — ED NOTES
[facility]  ED Nurse Handoff Report    ED Chief complaint: Abdominal Pain (abd pain since friday. +nausea. No vomiting or diarrhea)      ED Diagnosis:   Final diagnoses:   Acute appendicitis with localized peritonitis, without perforation, abscess, or gangrene       Code Status: Full Code    Allergies:   Allergies   Allergen Reactions     Dust Mites        Activity level - Baseline/Home:  Independent    Activity Level - Current:   Independent     Needed?: No    Isolation: No  Infection: Not Applicable  Bariatric?: No    Vital Signs:   Vitals:    10/25/18 1030 10/25/18 1100 10/25/18 1145 10/25/18 1200   BP: 151/69 152/72 149/72 144/64   Pulse: 100  83 86   Resp: 16 18 16 16   Temp:       TempSrc:       SpO2: 100% 98% 96% 93%   Weight:       Height:           Cardiac Rhythm: ,        Pain level: 0-10 Pain Scale: 5    Is this patient confused?: No   Randall - Suicide Severity Rating Scale Completed?  Yes  If yes, what color did the patient score?  White    Patient Report: Initial Complaint: pt with generalized abd pain past few days today localied rlq denies vomiting  Focused Assessment: pain llq pt has appendicitis  Tests Performed: labs ct scan  Abnormal Results: ct scan appendicitis  Treatments provided: zosyn given, 1 liter of fluid and dilaudid 0.5 X2    Family Comments: daughter at bedside    OBS brochure/video discussed/provided to patient/family: Yes              Name of person given brochure if not patient:               Relationship to patient:     ED Medications:   Medications   ondansetron (ZOFRAN) injection 4 mg (4 mg Intravenous Given 10/25/18 0933)   0.9% sodium chloride BOLUS (0 mLs Intravenous Stopped 10/25/18 1132)   HYDROmorphone (PF) (DILAUDID) injection 0.5 mg (0.5 mg Intravenous Given 10/25/18 0934)   iopamidol (ISOVUE-370) solution 135 mL (135 mLs Intravenous Given 10/25/18 1132)   Saline (80 mLs As instructed Given 10/25/18 1132)   piperacillin-tazobactam (ZOSYN)  3.375 g vial to attach to  mL bag (3.375 g Intravenous New Bag 10/25/18 1224)   HYDROmorphone (PF) (DILAUDID) injection 0.5 mg (0.5 mg Intravenous Given 10/25/18 1222)       Drips infusing?:  No    For the majority of the shift this patient was Green.   Interventions performed were rounds and suppoort.    Severe Sepsis OR Septic Shock Diagnosis Present: No    To be done/followed up on inpatient unit:  none  ED NURSE PHONE NUMBER: [phone]

## 2018-10-25 NOTE — ANESTHESIA PREPROCEDURE EVALUATION
Procedure: Procedure(s):  LAPAROSCOPIC APPENDECTOMY  Preop diagnosis: ACUTE APPENDICITIS.    Allergies   Allergen Reactions     Dust Mites        History reviewed. No pertinent past medical history.    History reviewed. No pertinent surgical history.    Social History   Substance Use Topics     Smoking status: Never Smoker     Smokeless tobacco: Not on file     Alcohol use Yes       Prior to Admission medications    Not on File     Current Facility-Administered Medications Ordered in Epic   Medication Dose Route Frequency Last Rate Last Dose     ondansetron (ZOFRAN) injection 4 mg  4 mg Intravenous Q30 Min PRN   4 mg at 10/25/18 0933     No current [facility]-ordered outpatient prescriptions on file.         Wt Readings from Last 1 Encounters:   10/25/18 138.8 kg (306 lb)     Temp Readings from Last 1 Encounters:   10/25/18 37.3  C (99.1  F) (Oral)     BP Readings from Last 6 Encounters:   10/25/18 129/75   03/18/15 136/72     Pulse Readings from Last 4 Encounters:   10/25/18 83   03/18/15 72     Resp Readings from Last 1 Encounters:   10/25/18 18     SpO2 Readings from Last 1 Encounters:   10/25/18 96%     Recent Labs   Lab Test  10/25/18   0920   NA  139   POTASSIUM  4.0   CHLORIDE  103   CO2  27   ANIONGAP  9   GLC  95   BUN  10   CR  0.62   THOM  9.4     Recent Labs   Lab Test  10/25/18   0920   AST  11   ALT  16   ALKPHOS  69   BILITOTAL  0.5     Recent Labs   Lab Test  10/25/18   0920   WBC  9.3   HGB  12.7   PLT  191     No results for input(s): ABO, RH in the last 41905 hours.  No results for input(s): INR, PTT in the last 87803 hours.   No results for input(s): TROPI in the last 09026 hours.  No results for input(s): PH, PCO2, PO2, HCO3 in the last 45032 hours.  No results for input(s): HCG in the last 92012 hours.    Recent Results (from the past 744 hour(s))   CT Abdomen Pelvis w Contrast    Narrative    CT ABDOMEN AND PELVIS WITH CONTRAST   10/25/2018 11:35 AM     HISTORY: Right lower quadrant abdominal pain.      TECHNIQUE:   Axial CT images of the abdomen and pelvis were obtained  following the administration of intravenous contrast with a dosage of  135 mL of Isovue-370 solution.     Radiation dose for this scan was reduced using automated exposure  control, adjustment of the mA and/or kV according to patient size, or  iterative reconstruction technique.    COMPARISON: None.    FINDINGS:  The appendix is hyperenhancing and dilated measuring up to  16 mm. There is extensive periappendiceal inflammatory stranding. A  1-2 mm appendicolith is present within the mid appendix. No evidence  of abscess or perforation. Bowel is otherwise unremarkable. No  evidence of obstruction. 1 cm hypoattenuating structure within the  right liver is incompletely characterized, statistically likely  benign. A 2-3 cm structure within the posterior right hepatic dome is  incompletely characterized, statistically likely benign. Otherwise,  the liver, gallbladder, spleen, pancreas, adrenals, and kidneys are  unremarkable. Abdominal aorta is normal in caliber. Bladder is  unremarkable. Trace free fluid is present within the pelvis. Bone  windows demonstrate no destructive or aggressive osseous lesions.      Impression    IMPRESSION: Acute uncomplicated appendicitis. Recommend surgical  consult.    MIRA ZAMUDIO MD     Anesthesia Evaluation     . Pt has not had prior anesthetic            ROS/MED HX    ENT/Pulmonary:     (+)ALLIE risk factors snores loudly, obese, , . .   (-) tobacco use   Neurologic:  - neg neurologic ROS     Cardiovascular:     (+) ----. : . . . :. . Previous cardiac testing date:results:date: results:ECG reviewed date:3/18/15 results:NSR date: results:          METS/Exercise Tolerance:     Hematologic:         Musculoskeletal:         GI/Hepatic:         Renal/Genitourinary:         Endo:     (+) Obesity, .      Psychiatric:     (+) psychiatric history depression      Infectious Disease:         Malignancy:         Other:                      Physical Exam  Normal systems: cardiovascular, pulmonary and dental    Airway   Mallampati: II  TM distance: >3 FB  Neck ROM: full    Dental     Cardiovascular       Pulmonary                     Anesthesia Plan      History & Physical Review  History and physical reviewed and following examination; no interval change.    ASA Status:  3 .    NPO Status:  > 8 hours    Plan for General, RSI and ETT with Intravenous and Propofol induction. Maintenance will be Balanced.    PONV prophylaxis:  Ondansetron (or other 5HT-3) and Dexamethasone or Solumedrol  Additional equipment: Videolaryngoscope Shoulder rolls      Postoperative Care  Postoperative pain management:  IV analgesics and Oral pain medications.      Consents  Anesthetic plan, risks, benefits and alternatives discussed with:  Patient..                          .

## 2018-10-25 NOTE — PHARMACY-ADMISSION MEDICATION HISTORY
Admission medication history interview status for the 10/25/2018  admission is complete. See EPIC admission navigator for prior to admission medications     Medication history source reliability:Good    Actions taken by pharmacist (provider contacted, etc): Chart review. Face to face interview with patient and daughter. Daughter also had the patient's Prescription bottles in hand to double check dosages.       Additional medication history information not noted on PTA med list :None    Medication reconciliation/reorder completed by provider prior to medication history? No    Time spent in this activity: 15 minutes    Prior to Admission medications    Medication Sig Last Dose Taking? Auth Provider   divalproex sodium extended-release (DEPAKOTE ER) 500 MG 24 hr tablet Take 1,000 mg by mouth At Bedtime 10/24/2018 at hs Yes Unknown, Entered By History   loratadine (CLARITIN) 10 MG tablet Take 10 mg by mouth every morning 10/25/2018 at am Yes Unknown, Entered By History   sertraline (ZOLOFT) 100 MG tablet Take 200 mg by mouth daily 10/25/2018 at am Yes Unknown, Entered By History   traZODone (DESYREL) 50 MG tablet Take 50 mg by mouth At Bedtime 10/24/2018 at hs Yes Unknown, Entered By History

## 2018-10-25 NOTE — LETTER
[facility] Surgery Department  [address] [place] [id number]  [phone]          October 26, 2018    RE:  Sherrie Swanson Behboudi                                                                                                                                                       [address] [place] [address]            To whom it may concern:    Sherrie Swanson Behboudi is under my professional care for emergent abdominal surgery.  She will need time off work for recovery and will not be able to perform activity involving heavy lifting or vigorous activity for at least a couple weeks.       Sincerely,        Timoteo Sandoval PA-C  Office: [phone]  Pager: [phone]

## 2018-10-26 VITALS
HEIGHT: 68 IN | BODY MASS INDEX: 44.41 KG/M2 | OXYGEN SATURATION: 94 % | SYSTOLIC BLOOD PRESSURE: 140 MMHG | TEMPERATURE: 98 F | DIASTOLIC BLOOD PRESSURE: 65 MMHG | WEIGHT: 293 LBS | HEART RATE: 83 BPM | RESPIRATION RATE: 15 BRPM

## 2018-10-26 LAB — GLUCOSE BLDC GLUCOMTR-MCNC: 103 MG/DL (ref 70–99)

## 2018-10-26 PROCEDURE — 82962 GLUCOSE BLOOD TEST: CPT

## 2018-10-26 PROCEDURE — 25000128 H RX IP 250 OP 636: Performed by: PHYSICIAN ASSISTANT

## 2018-10-26 PROCEDURE — 40000934 ZZH STATISTIC OUTPATIENT (NON-OBS) DAY

## 2018-10-26 PROCEDURE — 25000132 ZZH RX MED GY IP 250 OP 250 PS 637: Performed by: PHYSICIAN ASSISTANT

## 2018-10-26 PROCEDURE — 25000132 ZZH RX MED GY IP 250 OP 250 PS 637: Performed by: SURGERY

## 2018-10-26 RX ORDER — ONDANSETRON 4 MG/1
4 TABLET, ORALLY DISINTEGRATING ORAL EVERY 6 HOURS PRN
Qty: 20 TABLET | Refills: 0 | Status: SHIPPED | OUTPATIENT
Start: 2018-10-26 | End: 2023-07-07

## 2018-10-26 RX ORDER — ACETAMINOPHEN 325 MG/1
650 TABLET ORAL EVERY 6 HOURS PRN
Status: DISCONTINUED | OUTPATIENT
Start: 2018-10-26 | End: 2018-10-26 | Stop reason: HOSPADM

## 2018-10-26 RX ADMIN — ACETAMINOPHEN 650 MG: 325 TABLET, FILM COATED ORAL at 07:56

## 2018-10-26 RX ADMIN — ACETAMINOPHEN 650 MG: 325 TABLET, FILM COATED ORAL at 02:23

## 2018-10-26 RX ADMIN — TRAZODONE HYDROCHLORIDE 50 MG: 50 TABLET ORAL at 02:23

## 2018-10-26 RX ADMIN — OXYCODONE HYDROCHLORIDE 5 MG: 5 TABLET ORAL at 08:54

## 2018-10-26 RX ADMIN — ONDANSETRON 4 MG: 2 INJECTION INTRAMUSCULAR; INTRAVENOUS at 08:54

## 2018-10-26 RX ADMIN — SERTRALINE HYDROCHLORIDE 200 MG: 50 TABLET ORAL at 07:56

## 2018-10-26 NOTE — PLAN OF CARE
Problem: Patient Care Overview  Goal: Plan of Care/Patient Progress Review  Outcome: Improving  A/Ox4. VSS on RA. Abd lap sites x3 CDI. CMS intact. Abdominal discomfort relieved with oxycodone x1. Nausea relieved with zofran x1. HA relieved with tylenol. Voiding well. + Flatus, +BS. Regular diet. SBA. Ambulation in hallway x2. PIV SL. Discharge instructions reviewed with patient and daughter. Pt discharging with scripts and belongings. Pt to discharge home with transportation provided by daughter. Questions answered.

## 2018-10-26 NOTE — PROVIDER NOTIFICATION
MD Notification    Notified Person: MD    Notified Person Name: Dr. Jennings    Notification Date/Time: 10/26/18 @ 0920    Notification Interaction: Telephone    Purpose of Notification: BECKHAM, unable to wean off 1L NC. Sats at 84 on RA. Nausea reported.     Orders Received: Minimize narcotics as much as possible, encourage ambulation. Will re-assess this afternoon.     Comments: Nausea relieved with 1x zofran. Pt agreeable with plan of care.

## 2018-10-26 NOTE — PLAN OF CARE
Problem: Patient Care Overview  Goal: Plan of Care/Patient Progress Review  Outcome: Improving   A/OX4.On capno w/ 1L oxygen, VSS. Abd lap sites x3 w/ scant sanguinous drainage. CMS intact. C/O HA/temp 100F, resolved with Tylenol. Regular diet Ambulating to the BR SBA, voiding. PIV SL. Will continue to monitor.

## 2018-10-26 NOTE — PROGRESS NOTES
"[facility]  GENERAL SURGERY Progress Note    Admission Date: 10/25/2018  10/26/2018         Assessment and Plan:   Sherrie Swanson Behboudi is a 58 year old female S/P Procedure(s):  LAPAROSCOPIC APPENDECTOMY, 1 Day Post-Op.  - ADAT  - Ambulate 4x day and encourage IS  - Discharge home   - Discharge instructions discussed             Interval History:   Doing well, sore at incisions but otherwise no complaints, pain controlled, tolerating food, ambulating, UO adequate. Meds reviewed.                      Physical Exam:   Blood pressure 140/65, pulse 83, temperature 98  F (36.7  C), temperature source Oral, resp. rate 15, height 1.737 m (5' 8.4\"), weight 138.8 kg (306 lb), SpO2 94 %.  Temperature Temp  Av.7  F (37.1  C)  Min: 97.2  F (36.2  C)  Max: 100  F (37.8  C)   I/O last 3 completed shifts:  In: 1380 [P.O.:480; I.V.:900]  Out: 5 [Blood:5]  Constitutional:  Awake, alert, oriented, and in no apparent distress.   Lungs: No increased work of breathing, good air exchange, clear to auscultation bilaterally, and no crackles or wheezing.   Cardiovascular: Regular rate and rhythm, normal S1 and S2, and no murmur noted.   Abdomen: Soft, non-distended, appropriately tender at incision(s), + BS.   Wounds: Clean, dry, and intact. Steri strips in place. No erythema or drainage.    Extremities: No edema or calf tenderness.          Data:     Recent Labs   Lab Test  10/25/18   0920   WBC  9.3   HGB  12.7   HCT  39.6   PLT  191      Recent Labs   Lab Test  10/25/18   0920   NA  139   POTASSIUM  4.0   CHLORIDE  103   CO2  27   BUN  10   CR  0.62     Recent Labs   Lab Test  10/25/18   0920   BILITOTAL  0.5   ALT  16   AST  11   ALKPHOS  69       Timoteo Sandoval PA-C  Office: [phone]  Pager: [phone]    "

## 2018-10-26 NOTE — DISCHARGE SUMMARY
"Surgery Discharge Summary    Sherrie Swanson Behboudi MRN# [id number]   YOB: 1960 Age: 58 year old     Date of Admission:  10/25/2018  Date of Discharge:  10/26/2018  Admitting Physician:  Danilo Jennings MD  Discharging Service:  [facility] General Surgery   Primary Provider: [place], [facility] [place]    Discharge Diagnosis:   Principle Diagnosis:  Acute appendicitis with localized peritonitis, without perforation, abscess, or gangrene [K35.30]    Hospital Course: Sherrie Swanson Behboudi underwent a laparoscopic appendectomy without complications. Intra-op findings were consistent with grossly nonperforated acute appendicitis. Please see op note for further details. Post-op she was admitted to the floor. On POD #1, she was initiated on a diet and oral pain medications which she tolerated well. Her hospital course remained uncomplicated and she recovered as anticipated. On day of discharge, she was tolerating an oral diet, had good pain control on oral medications, was ambulating independently and remained afebrile thus medically appropriate for discharge. She will follow-up with us in two weeks and was advised to call with any questions or concerns.     Inpatient Consultations: No consultations were requested during this admission    Procedures:   Laparoscopic appendectomy      Labs/Imaging:   Results for orders placed or performed during the hospital encounter of 10/25/18 (from the past 24 hour(s))   Glucose by meter   Result Value Ref Range    Glucose 103 (H) 70 - 99 mg/dL       Disposition:   Discharged to home     Discharge Condition  Discharge condition: Stable   Discharge vitals: Blood pressure 140/65, pulse 83, temperature 98  F (36.7  C), temperature source Oral, resp. rate 15, height 1.737 m (5' 8.4\"), weight 138.8 kg (306 lb), SpO2 94 %.     Discharge Medications:   Current Discharge Medication List      START taking these medications    Details   ondansetron (ZOFRAN-ODT) 4 MG ODT " tab Take 1 tablet (4 mg) by mouth every 6 hours as needed for nausea or vomiting  Qty: 20 tablet, Refills: 0    Associated Diagnoses: Acute appendicitis with localized peritonitis, without perforation, abscess, or gangrene      oxyCODONE IR (ROXICODONE) 5 MG tablet Take 1-2 tablets (5-10 mg) by mouth every 6 hours as needed for pain (Moderate to Severe)  Qty: 15 tablet, Refills: 0    Associated Diagnoses: Acute appendicitis with localized peritonitis, without perforation, abscess, or gangrene         CONTINUE these medications which have NOT CHANGED    Details   divalproex sodium extended-release (DEPAKOTE ER) 500 MG 24 hr tablet Take 1,000 mg by mouth At Bedtime      loratadine (CLARITIN) 10 MG tablet Take 10 mg by mouth every morning      sertraline (ZOLOFT) 100 MG tablet Take 200 mg by mouth daily      traZODone (DESYREL) 50 MG tablet Take 50 mg by mouth At Bedtime             Discharge Instructions:      [facility]  Discharge Instructions: Post-Operative Laparoscopic Appendectomy    ACTIVITY    Expect to feel tired after your surgery.  This will gradually resolve.      Take frequent, short walks and increase your activity gradually.      Avoid strenuous physical activity or heavy lifting greater than 15-20 lbs. for 2-3 weeks.  You may climb stairs.    You may drive without restrictions when you are not using any prescription pain medication and feel comfortable in a car.    You may return to work/school when you are comfortable without any prescription pain medication.    WOUND CARE    You may remove your outer dressing or Band-Aids and shower 48 hours after the surgery.  Pat your incisions dry and leave them open to air.  Re-apply dressing (Band-Aids or gauze/tape) as needed for comfort or drainage.    You may have steri-strips (looks like white tape) on your incision.  You may peel off the steri-strips 2 weeks after your surgery if they have not peeled off on their own.      Do not soak your incisions in a tub or pool for 2 weeks.     Do not apply any lotions, creams, or ointments to your incisions.    A ridge under your incisions is normal and will gradually resolve.    DIET    Start with liquids, then gradually resume your regular diet as tolerated.  Avoid heavy, spicy, and greasy meals for 2-3 days.    Drink plenty of fluids to stay hydrated.    PAIN    Expect some tenderness and discomfort at the incision sites.  Use the prescribed pain medication at your discretion.  Expect gradual resolution of your pain over several days.    You may take ibuprofen with food (unless you have been told not to) instead of or in addition to your prescribed pain medication.  If you are taking Norco or Percocet, do not take any additional acetaminophen/APAP/Tylenol.    Do not drink alcohol or drive while you are taking pain medications.    You may apply ice to your incisions in 20 minute intervals as needed for the next 48 hours.  After that time, consider switching to heat if you prefer.    EXPECTATIONS    Pain medications can cause constipation.  Limit use when possible.  Take over the counter stool softener/stimulant, such as Colace or Senna, 1-2 times a day with plenty of water.  You may take a mild over the counter laxative, such as Miralax or a suppository, as needed.  You make discontinue these medications once you are having regular bowel movements and/or are no longer taking your narcotic pain medication.     You may have shoulder or upper back discomfort due to the gas used in surgery.  This is temporary and should resolve in 48-72 hours.  Short, frequent walks may help with this.    FOLLOW UP    Our office will contact you approximately 2 weeks to check on your progress and answer any questions you may have.  If you are doing well, you will not need to return for a follow up appointment.  If any concerns are identified over the phone, we will help you make an appointment to see a  provider.     If you have not received a phone call, have any questions or concerns, or would like to be seen, please call us at [phone] and ask to speak with our nurse.  We are located at [address] [place] [address].    CALL OUR OFFICE -[phone] IF YOU HAVE:     Chills or fever above 101 F.    Increased redness, warmth, or drainage at your incisions.    Significant bleeding.    Pain not relieved by your pain medication or rest.    Increasing pain after the first 48 hours.    Any other concerns or questions.    Revised January 2018            Timoteo Sandoval PA-C   Office: [phone]

## 2018-10-26 NOTE — PROVIDER NOTIFICATION
MD Notification    Notified Person: MD    Notified Person Name: Earl    Notification Date/Time: 10/26/2018/ 01:11 AM    Notification Interaction: On call pager    Purpose of Notification: Temp 100 (Oral), headaches.    Orders Received: Awaiting orders    Comments:

## 2018-10-26 NOTE — PROGRESS NOTES
Outpatient in a Bed discharge goals PRIOR TO DISCHARGE     Comments: List all Outpatient in a Bed discharge goals to be met before discharge home:   ~ Patient able to ambulate as they were prior to admission or with assist devices provided by therapies during their stay - MET; pt able to ambulate in hallway, denies SOB/BECKHAM, sats 91% on RA following ambulation.   ~ Nurse to Notify Provider when Outpatient in a Bed discharge goals have been met and patient is ready for discharge.

## 2018-10-26 NOTE — PROGRESS NOTES
Outpatient in a Bed discharge goals PRIOR TO DISCHARGE     Comments: List all Outpatient in a Bed discharge goals to be met before discharge home:   ~ Patient able to ambulate as they were prior to admission or with assist devices provided by therapies during their stay. Partially met; pt reports some SOB with exertion, on 1L NC, unable to wean to RA  ~ Nurse to Notify Provider when Outpatient in a Bed discharge goals have been met and patient is ready for discharge.

## 2018-10-26 NOTE — PLAN OF CARE
Problem: Surgery Nonspecified (Adult)  Goal: Signs and Symptoms of Listed Potential Problems Will be Absent, Minimized or Managed (Surgery Nonspecified)  Signs and symptoms of listed potential problems will be absent, minimized or managed by discharge/transition of care (reference Surgery Nonspecified (Adult) CPG).  Outcome: Therapy, progress toward functional goals as expected  Pt arrived on the unit at around 1710hrs from PACU. A/OX4. Sleepy at times, Trazodone held. On capno w/ O2 4L NC other VSS. Abd lap sites x3 w/ scant serosangunous drainage. BS hypo, no flatus.Denies pain. Ambulating to the BR w/ A1. Adequate UOP.

## 2018-10-27 ENCOUNTER — NURSE TRIAGE (OUTPATIENT)
Dept: NURSING | Facility: CLINIC | Age: 58
End: 2018-10-27

## 2018-10-27 NOTE — TELEPHONE ENCOUNTER
Patient states she was discharged from hospital yesterday after having appendix removed and has questions; asking if she should be on an antibiotic, what other medications can she take beside oxycodone for pain as she doesn't feel her concerns were addressed.  She also forgot her inspirometer and needs one.  FNA provided contact number as listed in Discharge Instructions.

## 2018-10-30 LAB — COPATH REPORT: NORMAL

## 2018-11-08 ENCOUNTER — TELEPHONE (OUTPATIENT)
Dept: SURGERY | Facility: CLINIC | Age: 58
End: 2018-11-08

## 2018-11-08 NOTE — TELEPHONE ENCOUNTER
Patient called back to discuss returning to work and restrictions.  She has not returned to work yet, but would like to return to work on Monday, November 12.  Informed her that this is acceptable, but that she will be on a 15 pound weight restriction for one week after returning.  She verbalized understanding.    She is also wanting to discuss paperwork that she is trying to fill out.  Informed her that she can just fill out the employee section and we can finish the rest for her and get it faxed in to the appropriate place.    Provided her with a confidential email address to sent the paperwork to.    She will call PRN.    Soni Martino RN

## 2018-11-08 NOTE — TELEPHONE ENCOUNTER
Patient left message on triage line last evening wishing to discuss returning to work, along with other recovery related questions s/p laparoscopic appendectomy on 10/25/18.    Left message this morning for patient to call back at her convenience to discuss.    Soni Martino RN

## 2018-11-29 ENCOUNTER — TELEPHONE (OUTPATIENT)
Dept: SURGERY | Facility: CLINIC | Age: 58
End: 2018-11-29

## 2018-11-29 NOTE — TELEPHONE ENCOUNTER
Patient called back to discuss.  Informed her of pathology results and inquired about colonoscopy. She reports that she had a colonoscopy a few weeks prior to having her appendix removed.  She reports that a couple of polyps were removed during the colonoscopy and that she needs to repeat in another 5 years.    Informed her that is our recommendation as well.    She is having issues with [employer] and her disability.  Will call and discuss with them today.    Soni Martino RN

## 2018-11-29 NOTE — TELEPHONE ENCOUNTER
Patient recently had appendectomy.  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    Attempted to contact patient to discuss this finding and to inquire if she has had a colonoscopy within the past 5 years.  If she has not had a colonoscopy within the past 5 years, it is recommended that she obtain one.    Left call back number for patient to call and discuss.    Soni Martino RN

## 2023-07-06 ENCOUNTER — LAB REQUISITION (OUTPATIENT)
Dept: LAB | Facility: CLINIC | Age: 63
End: 2023-07-06

## 2023-07-06 DIAGNOSIS — Z00.01 ENCOUNTER FOR GENERAL ADULT MEDICAL EXAMINATION WITH ABNORMAL FINDINGS: ICD-10-CM

## 2023-07-06 RX ORDER — VITAMIN B COMPLEX
1 TABLET ORAL DAILY
COMMUNITY

## 2023-07-06 RX ORDER — HYDROMORPHONE HYDROCHLORIDE 2 MG/1
2 TABLET ORAL EVERY 6 HOURS PRN
COMMUNITY
End: 2023-07-18

## 2023-07-06 RX ORDER — FOLIC ACID 1 MG/1
1 TABLET ORAL DAILY
COMMUNITY

## 2023-07-06 RX ORDER — ASPIRIN 81 MG/1
162 TABLET ORAL DAILY
COMMUNITY

## 2023-07-06 RX ORDER — GABAPENTIN 400 MG/1
400 CAPSULE ORAL AT BEDTIME
COMMUNITY

## 2023-07-06 RX ORDER — ACETAMINOPHEN 500 MG
1000 TABLET ORAL 3 TIMES DAILY
COMMUNITY

## 2023-07-06 RX ORDER — POLYETHYLENE GLYCOL 3350 17 G/17G
17 POWDER, FOR SOLUTION ORAL 2 TIMES DAILY
COMMUNITY

## 2023-07-06 RX ORDER — SENNOSIDES 8.6 MG
1 TABLET ORAL 2 TIMES DAILY
COMMUNITY
End: 2023-07-18

## 2023-07-07 ENCOUNTER — TRANSITIONAL CARE UNIT VISIT (OUTPATIENT)
Dept: GERIATRICS | Facility: CLINIC | Age: 63
End: 2023-07-07
Payer: COMMERCIAL

## 2023-07-07 VITALS
OXYGEN SATURATION: 96 % | SYSTOLIC BLOOD PRESSURE: 139 MMHG | RESPIRATION RATE: 18 BRPM | TEMPERATURE: 97.4 F | BODY MASS INDEX: 46.53 KG/M2 | HEIGHT: 68 IN | DIASTOLIC BLOOD PRESSURE: 64 MMHG | HEART RATE: 82 BPM

## 2023-07-07 DIAGNOSIS — R33.9 URINARY RETENTION: ICD-10-CM

## 2023-07-07 DIAGNOSIS — R53.81 PHYSICAL DECONDITIONING: ICD-10-CM

## 2023-07-07 DIAGNOSIS — S72.91XD CLOSED FRACTURE OF RIGHT FEMUR WITH ROUTINE HEALING, UNSPECIFIED FRACTURE MORPHOLOGY, UNSPECIFIED PORTION OF FEMUR, SUBSEQUENT ENCOUNTER: Primary | ICD-10-CM

## 2023-07-07 DIAGNOSIS — D69.6 THROMBOCYTOPENIA (H): ICD-10-CM

## 2023-07-07 DIAGNOSIS — D62 ABLA (ACUTE BLOOD LOSS ANEMIA): ICD-10-CM

## 2023-07-07 DIAGNOSIS — Z71.89 ADVANCED DIRECTIVES, COUNSELING/DISCUSSION: ICD-10-CM

## 2023-07-07 DIAGNOSIS — F31.9 BIPOLAR AFFECTIVE DISORDER, REMISSION STATUS UNSPECIFIED (H): ICD-10-CM

## 2023-07-07 PROCEDURE — 36415 COLL VENOUS BLD VENIPUNCTURE: CPT | Performed by: NURSE PRACTITIONER

## 2023-07-07 PROCEDURE — 99309 SBSQ NF CARE MODERATE MDM 30: CPT | Performed by: NURSE PRACTITIONER

## 2023-07-07 PROCEDURE — 86481 TB AG RESPONSE T-CELL SUSP: CPT | Performed by: NURSE PRACTITIONER

## 2023-07-07 PROCEDURE — P9604 ONE-WAY ALLOW PRORATED TRIP: HCPCS | Performed by: NURSE PRACTITIONER

## 2023-07-07 RX ORDER — ALBUTEROL SULFATE 90 UG/1
2 AEROSOL, METERED RESPIRATORY (INHALATION) EVERY 4 HOURS PRN
COMMUNITY

## 2023-07-07 NOTE — PROGRESS NOTES
[facility] GERIATRICS    PRIMARY CARE PROVID[facility] AND CLINIC:  [facility], [address] / [facility] [address]  Chief Complaint   Patient presents with    Hospital F/U      [facility] Medical Record Number:  [id number]  Place of Service where encounter took place:  [facility] (TCU) [[id number]]    Sherrie Swanson Behboudi  is a 63 year old  (1960), admitted to the above facility from  [facility] . Hospital stay 6/26/23 through 7/6/23.  HPI:    63 year old female PMH bipolar disorder and obesity hospitalized after fall and right leg pain, CT showed distal intra-articular femoral fracture now s/p ORIF 6/27. Pain managed with tylenol, neurontin, dilaudid and tramadol. ASA for DVT prophylaxis. Bipolar: on Depakote and Zoloft. ABLA Hgb improved. Thrombocytopenia resolved. Urinary retention resolved. To TCU for rehab.      Seen for initial TCU visit. Reports doing well, pain controlled. Asks to be Full Code. BP range 122-139/62-78 and sats 96% room air.     CODE STATUS/ADVANCE DIRECTIVES DISCUSSION:  Full Code  CPR/Full code   ALLERGIES:   Allergies   Allergen Reactions    Dust Mites     Pcn [Penicillins] Unknown      PAST MEDICAL HISTORY: No past medical history on file.   PAST SURGICAL HISTORY:   has a past surgical history that includes Laparoscopic appendectomy (N/A, 10/25/2018).  FAMILY HISTORY: family history is not on file.  SOCIAL HISTORY:   reports that she has never smoked. She has never used smokeless tobacco. She reports current alcohol use. She reports that she does not use drugs.  Patient's living condition: lives with family, DAUGHTER     Post Discharge Medication Reconciliation Status:   MED REC REQUIRED  Post Medication Reconciliation Status:  Discharge medications reconciled and changed, see notes/orders         Current Outpatient Medications   Medication Sig    acetaminophen (TYLENOL) 500 MG tablet Take 1,000 mg by mouth 3 times daily     "albuterol (PROAIR HFA/PROVENTIL HFA/VENTOLIN HFA) 108 (90 Base) MCG/ACT inhaler Inhale 2 puffs into the lungs every 4 hours as needed for shortness of breath, wheezing or cough    aspirin 81 MG EC tablet Take 81 mg by mouth daily    Calcium Carb-Cholecalciferol (CALCIUM 600 + D PO) Take 600 mg by mouth 2 times daily    divalproex sodium extended-release (DEPAKOTE ER) 500 MG 24 hr tablet Take 1,000 mg by mouth At Bedtime    folic acid (FOLVITE) 1 MG tablet Take 1 mg by mouth daily    gabapentin (NEURONTIN) 400 MG capsule Take 400 mg by mouth At Bedtime    HYDROmorphone (DILAUDID) 2 MG tablet Give 2 mg by mouth every 4 hours as needed for pain rated at 4-7 Take 1 tablet (2mg) for pain rated at 4-7. AND Give 4 mg by mouth every 4 hours as needed for pain rated 8-10 Take 2 tablets (4mg) for pain rated 8-10    polyethylene glycol (MIRALAX) 17 g packet Take 17 g by mouth daily as needed for constipation    sennosides (SENOKOT) 8.6 MG tablet Take 1 tablet by mouth 2 times daily    sertraline (ZOLOFT) 100 MG tablet Take 200 mg by mouth daily    traZODone (DESYREL) 50 MG tablet Take 50 mg by mouth nightly as needed    vitamin B-12 (CYANOCOBALAMIN) 1000 MCG tablet Take 1,000 mcg by mouth daily    vitamin D3 (CHOLECALCIFEROL) 1.25 MG (14477 UT) capsule Take 50,000 Units by mouth every 7 days    Vitamin D3 (CHOLECALCIFEROL) 25 mcg (1000 units) tablet Take 1 tablet by mouth daily     No current facility-administered medications for this visit.       ROS:  10 point ROS of systems including Constitutional, Eyes, Respiratory, Cardiovascular, Gastroenterology, Genitourinary, Integumentary, Musculoskeletal, Psychiatric were all negative except for pertinent positives noted in my HPI.    Vitals:  /64   Pulse 82   Temp 97.4  F (36.3  C)   Resp 18   Ht 1.727 m (5' 8\")   SpO2 96%   BMI 46.53 kg/m    Exam:  GENERAL APPEARANCE:  Alert, in no distress, pleasant, cooperative, oriented x 4  EYES:  EOM, lids, pupils and irises " normal, sclera clear and conjunctiva normal, no discharge or mattering on lids or lashes noted  ENT:  Mouth normal, moist mucous membranes, nose normal without drainage or crusting, external ears without lesions, hearing acuity intact  NECK: supple, symmetrical, trachea midline  RESP:  respiratory effort normal, no chest wall tenderness, no respiratory distress, Lung sounds clear, patient is on room air  CV:  Auscultation of heart done, rate and rhythm controlled and regular, no murmur, no rub or gallop. Edema trace bilateral lower extremities  ABDOMEN:  normal bowel sounds, soft, nontender, no palpable masses.  M/S:   Gait and station unsafe without assistance, no tenderness or swelling of the joints; able to move all extremities, digits normal. Right leg in immobilizer  NEURO: cranial nerves 2-12 grossly intact, no facial asymmetry, no speech deficits and able to follow directions, moves all extremities symmetrically  PSYCH:  insight and judgement and memory intact, affect and mood flat     Lab/Diagnostic data:  7/6 Na 139, K 4.1, Cr 0.47, BUN 8, Hgb 8.9    ASSESSMENT/PLAN:  Closed fracture of right femur with routine healing, unspecified fracture morphology, unspecified portion of femur, subsequent encounter  Physical deconditioning  Acute, s/p surgery. Continue tylenol 1000 mg TID, asa 81 mg daily for DVT prophylaxis, calcium vit D BID, neurontin 400 mg HS, dilaudid 2-4 mg every 4 hrs PRN, vit D 50,000 units weekly and 1000 units daily, therapies as ordered and f/u progress next visit. Ortho f/u as planned    Bipolar affective disorder, remission status unspecified (H)  Chronic, stable with PTA Depakote ER 1000 mg HS, Zoloft 200 mg daily, trazodone 50 mg HS PRN, monitor siria.     ABLA (acute blood loss anemia)  Thrombocytopenia (H)  Acute, improving. CBC check 7/11. Continue folic acid 1 mg daily.     Urinary retention  Resolved. Monitor for symptoms    Advanced directives, counseling/discussion  Asks to be full  code    Orders:  1. Full Code  2. Change albuterol inhaler to 2 puffs every 4 hrs PRN cough  3. CBC on 7/11 diagnosis anemia    Electronically signed by:  MILLIE Zee CNP

## 2023-07-09 LAB
GAMMA INTERFERON BACKGROUND BLD IA-ACNC: 0.02 IU/ML
M TB IFN-G BLD-IMP: NEGATIVE
M TB IFN-G CD4+ BCKGRND COR BLD-ACNC: 8.98 IU/ML
MITOGEN IGNF BCKGRD COR BLD-ACNC: 0.03 IU/ML
MITOGEN IGNF BCKGRD COR BLD-ACNC: 0.06 IU/ML
QUANTIFERON MITOGEN: 9 IU/ML
QUANTIFERON NIL TUBE: 0.02 IU/ML
QUANTIFERON TB1 TUBE: 0.05 IU/ML
QUANTIFERON TB2 TUBE: 0.08

## 2023-07-10 ENCOUNTER — LAB REQUISITION (OUTPATIENT)
Dept: LAB | Facility: CLINIC | Age: 63
End: 2023-07-10

## 2023-07-10 DIAGNOSIS — D64.9 ANEMIA, UNSPECIFIED: ICD-10-CM

## 2023-07-11 ENCOUNTER — TRANSITIONAL CARE UNIT VISIT (OUTPATIENT)
Dept: GERIATRICS | Facility: CLINIC | Age: 63
End: 2023-07-11
Payer: COMMERCIAL

## 2023-07-11 ENCOUNTER — TELEPHONE (OUTPATIENT)
Dept: GERIATRICS | Facility: CLINIC | Age: 63
End: 2023-07-11

## 2023-07-11 VITALS
DIASTOLIC BLOOD PRESSURE: 70 MMHG | RESPIRATION RATE: 18 BRPM | WEIGHT: 227.9 LBS | OXYGEN SATURATION: 99 % | SYSTOLIC BLOOD PRESSURE: 148 MMHG | TEMPERATURE: 97.7 F | HEART RATE: 53 BPM | BODY MASS INDEX: 34.54 KG/M2 | HEIGHT: 68 IN

## 2023-07-11 DIAGNOSIS — K59.03 DRUG-INDUCED CONSTIPATION: ICD-10-CM

## 2023-07-11 DIAGNOSIS — S72.001D CLOSED FRACTURE OF RIGHT HIP WITH ROUTINE HEALING: ICD-10-CM

## 2023-07-11 DIAGNOSIS — Z79.01 ANTICOAGULATED: ICD-10-CM

## 2023-07-11 DIAGNOSIS — R52 PAIN: ICD-10-CM

## 2023-07-11 DIAGNOSIS — F31.62 BIPOLAR DISORDER, CURRENT EPISODE MIXED, MODERATE (H): ICD-10-CM

## 2023-07-11 DIAGNOSIS — D62 ABLA (ACUTE BLOOD LOSS ANEMIA): ICD-10-CM

## 2023-07-11 DIAGNOSIS — F51.01 PRIMARY INSOMNIA: ICD-10-CM

## 2023-07-11 DIAGNOSIS — R33.9 URINARY RETENTION: ICD-10-CM

## 2023-07-11 LAB
ERYTHROCYTE [DISTWIDTH] IN BLOOD BY AUTOMATED COUNT: 15.4 % (ref 10–15)
HCT VFR BLD AUTO: 32.3 % (ref 35–47)
HGB BLD-MCNC: 9.6 G/DL (ref 11.7–15.7)
MCH RBC QN AUTO: 28.9 PG (ref 26.5–33)
MCHC RBC AUTO-ENTMCNC: 29.7 G/DL (ref 31.5–36.5)
MCV RBC AUTO: 97 FL (ref 78–100)
PLATELET # BLD AUTO: 258 10E3/UL (ref 150–450)
RBC # BLD AUTO: 3.32 10E6/UL (ref 3.8–5.2)
WBC # BLD AUTO: 4.5 10E3/UL (ref 4–11)

## 2023-07-11 PROCEDURE — P9604 ONE-WAY ALLOW PRORATED TRIP: HCPCS | Performed by: NURSE PRACTITIONER

## 2023-07-11 PROCEDURE — 99309 SBSQ NF CARE MODERATE MDM 30: CPT | Performed by: NURSE PRACTITIONER

## 2023-07-11 PROCEDURE — 36415 COLL VENOUS BLD VENIPUNCTURE: CPT | Performed by: NURSE PRACTITIONER

## 2023-07-11 PROCEDURE — 85014 HEMATOCRIT: CPT | Performed by: NURSE PRACTITIONER

## 2023-07-11 NOTE — PROGRESS NOTES
"[facility] GERIATRICS    Chief Complaint   Patient presents with     RECHECK     HPI:  Sherrie L Swanson Behboudi is a 63 year old  (1960), who is being seen today for an episodic care visit at: [facility] (TCU) [[id number]].     This is a 63 year old female PMH bipolar disorder and obesity hospitalized after fall and right leg pain, CT showed distal intra-articular femoral fracture now s/p ORIF 6/27. Pain managed with tylenol, neurontin, dilaudid and tramadol. ASA for DVT prophylaxis. Bipolar: on Depakote and Zoloft. ABLA Hgb/thrombocytopenia stable. Urinary retention resolved. Discharged to [facility] for rehab.     Today's concern is:   Pain control, insomnia, therapy progress    Allergies, and PMH/PSH reviewed in Psychiatric today.  REVIEW OF SYSTEMS:  4 point ROS including Respiratory, CV, GI and , other than that noted in the HPI,  is negative    Objective:   BP (!) 148/70   Pulse 53   Temp 97.7  F (36.5  C)   Resp 18   Ht 1.727 m (5' 8\")   Wt 103.4 kg (227 lb 14.4 oz)   SpO2 99%   BMI 34.65 kg/m    GENERAL APPEARANCE:  Alert, in no distress, pleasant, cooperative, wearing knee immobilizer  RESP:  respiratory effort normal, no chest wall tenderness, no respiratory distress, CTA, RA  CV:  Auscultation of heart done, rate and rhythm controlled and regular, no murmur, no rub or gallop. No edema  PSYCH:  A/O x3, SLUMS 30/30. Flat affect    Labs as of 7/6  Na 139  K 4.1  Cr 0.47  GFR >80  WBC 4.6  Hgb 8.9  MCV 92  Plt 198    Assessment/Plan:    Closed fracture of right femur with routine healing, unspecified fracture morphology, unspecified portion of femur, subsequent encounter  (S72.001D) Closed fracture of right hip with routine healing  Acute, s/p surgery.  Given TTWB, wearing knee immobilizer, vit D 50,000 units weekly and 1000 units daily.  Follow-up with Jacob Echols. F/u with Joyce LAINEZ on 8/15 per ortho    (R52) Pain  Continue Tylenol 1000 mg 3 times daily, Neurontin 400 mg at at bedtime, change " Dilaudid to 2mg every 6 hours as needed and encourage icing    (Z79.01) Anticoagulated  Continue  mg daily until 8/18    (F31.62) Bipolar disorder, current episode mixed, moderate (H)  (F51.01) Primary insomnia  Chronic, stable with PTA Depakote ER 1000 mg HS, Zoloft 200 mg daily. Today change trazodone to 50 mg at bedtime    Renal function  Last CR 0.47 with GFR >80 on 7/6     (D62) ABLA (acute blood loss anemia)  Thrombocytopenia (H)  Last Hgb 8.9, recheck CBC on 7/11. Continue folic acid 1 mg daily     (R33.9) Urinary retention  Resolved. Monitor for symptoms    (K59.03) Drug-induced constipation  Today change MiraLAX to daily with senna 1 tablet twice daily    Therapy  Working on strengthening, maintain weightbearing status    Orders:  Decrease Dilaudid, change trazodone, change melatonin    Electronically signed by: Danilo Unger NP

## 2023-07-11 NOTE — TELEPHONE ENCOUNTER
Ortho Nursing home visit    Sherrie L Swanson Behboudi is a 63 year old female who resides at [facility].     Patient is seen today for plan of care. Now 2 weeks, S/P ORIF> [facility]: missed F/U appt;      No past medical history on file.   Past Surgical History:   Procedure Laterality Date     LAPAROSCOPIC APPENDECTOMY N/A 10/25/2018    Procedure: LAPAROSCOPIC APPENDECTOMY;  Surgeon: Danilo Jennings MD;  Location:  OR        Allergies   Allergen Reactions     Dust Mites      Pcn [Penicillins] Unknown      There were no vitals taken for this visit.     Exam: Seen in  PT> TTWB only. Remains in knee immobilizer.Has barrier to discharge; Stairs, is considering discharge to daughters;    ASSESSMENT / PLAN: Talked with PN care team, and they will see on-site Thursday.    [id number]          [place] OPA-C  [phone] Cell

## 2023-07-13 VITALS
RESPIRATION RATE: 18 BRPM | TEMPERATURE: 97.8 F | OXYGEN SATURATION: 97 % | HEIGHT: 68 IN | BODY MASS INDEX: 34.62 KG/M2 | DIASTOLIC BLOOD PRESSURE: 66 MMHG | WEIGHT: 228.4 LBS | SYSTOLIC BLOOD PRESSURE: 105 MMHG | HEART RATE: 83 BPM

## 2023-07-14 ENCOUNTER — TRANSITIONAL CARE UNIT VISIT (OUTPATIENT)
Dept: GERIATRICS | Facility: CLINIC | Age: 63
End: 2023-07-14
Payer: COMMERCIAL

## 2023-07-14 DIAGNOSIS — K59.03 DRUG-INDUCED CONSTIPATION: ICD-10-CM

## 2023-07-14 DIAGNOSIS — R33.9 URINARY RETENTION: ICD-10-CM

## 2023-07-14 DIAGNOSIS — D62 ABLA (ACUTE BLOOD LOSS ANEMIA): ICD-10-CM

## 2023-07-14 DIAGNOSIS — F51.01 PRIMARY INSOMNIA: ICD-10-CM

## 2023-07-14 DIAGNOSIS — S72.001D CLOSED FRACTURE OF RIGHT HIP WITH ROUTINE HEALING: ICD-10-CM

## 2023-07-14 DIAGNOSIS — R52 PAIN: Primary | ICD-10-CM

## 2023-07-14 PROCEDURE — 99309 SBSQ NF CARE MODERATE MDM 30: CPT | Performed by: NURSE PRACTITIONER

## 2023-07-14 NOTE — PROGRESS NOTES
"[facility] GERIATRICS    Chief Complaint   Patient presents with     RECHECK     HPI:  Sherrie L Swanson Behboudi is a 63 year old  (1960), who is being seen today for an episodic care visit at: [facility] (TCU) [[id number]].     This is a 63 year old female PMH bipolar disorder and obesity hospitalized after fall and right leg pain, CT showed distal intra-articular femoral fracture now s/p ORIF 6/27. Pain managed with tylenol, neurontin, dilaudid and tramadol. ASA for DVT prophylaxis. Bipolar: on Depakote and Zoloft. ABLA Hgb/thrombocytopenia stable. Urinary retention resolved. Discharged to [facility] for rehab.     Today's concern is:  Pain control, f/u with ortho, constipation, therapy progress    Allergies, and PMH/PSH reviewed in [facility] today.  REVIEW OF SYSTEMS:  4 point ROS including Respiratory, CV, GI and , other than that noted in the HPI,  is negative    Objective:   /66   Pulse 83   Temp 97.8  F (36.6  C)   Resp 18   Ht 1.727 m (5' 8\")   Wt 103.6 kg (228 lb 6.4 oz)   SpO2 97%   BMI 34.73 kg/m    GENERAL APPEARANCE:  Alert, in no distress, pleasant, cooperative, pain controlled  RESP:  respiratory effort normal, no chest wall tenderness, no respiratory distress, CTA, RA  CV:  Auscultation of heart done, rate and rhythm controlled and regular, no murmur, no rub or gallop. No edema  PSYCH:  A/O x3, SLUMS 30/30. Flat affect      Most Recent 3 CBC's:  Recent Labs   Lab Test 07/11/23  0707 10/25/18  0920   WBC 4.5 9.3   HGB 9.6* 12.7   MCV 97 84    191     Assessment/Plan:    Closed fracture of right femur with routine healing, unspecified fracture morphology, unspecified portion of femur, subsequent encounter  (S72.001D) Closed fracture of right hip with routine healing  Now advanced to WBAT, immobilizer discontinued, vit D 50,000 units weekly and 1000 units daily.  F/u with Dr Nichols on 8/9, Joyce LAINEZ on 8/15 and Dexa scan on 10/31 per ortho     (R52) Pain  Continue " Tylenol 1000 mg 3 times daily, Neurontin 400 mg at at bedtime, Dilaudid 2mg every 6 hours as needed (2x/day) and encourage icing. Controlled     (Z79.01) Anticoagulated  Continue  mg daily until 8/18     (F31.62) Bipolar disorder, current episode mixed, moderate (H)  (F51.01) Primary insomnia  Chronic, stable with PTA Depakote ER 1000 mg HS, Zoloft 200 mg daily with trazodone 50 mg at bedtime. Stable     Renal function  Last CR 0.47 with GFR >80 on 7/6     (D62) ABLA (acute blood loss anemia)  Thrombocytopenia (H)  Last Hgb 9.6 on 7/11. Continue folic acid 1 mg daily     (R33.9) Urinary retention  Resolved     (K59.03) Drug-induced constipation  Today change MiraLAX to BID with senna 1 tablet twice daily     Therapy  Ambulating 145 feet without A/D, SBA    Orders:  Increase MiraLAX    Electronically signed by: Danilo Unger NP

## 2023-07-17 VITALS
RESPIRATION RATE: 20 BRPM | TEMPERATURE: 97.7 F | HEIGHT: 68 IN | WEIGHT: 228.4 LBS | OXYGEN SATURATION: 97 % | HEART RATE: 89 BPM | SYSTOLIC BLOOD PRESSURE: 104 MMHG | BODY MASS INDEX: 34.62 KG/M2 | DIASTOLIC BLOOD PRESSURE: 69 MMHG

## 2023-07-18 ENCOUNTER — DISCHARGE SUMMARY NURSING HOME (OUTPATIENT)
Dept: GERIATRICS | Facility: CLINIC | Age: 63
End: 2023-07-18
Payer: COMMERCIAL

## 2023-07-18 DIAGNOSIS — R33.9 URINARY RETENTION: ICD-10-CM

## 2023-07-18 DIAGNOSIS — S72.001D CLOSED FRACTURE OF RIGHT HIP WITH ROUTINE HEALING: ICD-10-CM

## 2023-07-18 DIAGNOSIS — F31.62 BIPOLAR DISORDER, CURRENT EPISODE MIXED, MODERATE (H): ICD-10-CM

## 2023-07-18 DIAGNOSIS — R52 PAIN: Primary | ICD-10-CM

## 2023-07-18 DIAGNOSIS — Z79.01 ANTICOAGULATED: ICD-10-CM

## 2023-07-18 DIAGNOSIS — D62 ABLA (ACUTE BLOOD LOSS ANEMIA): ICD-10-CM

## 2023-07-18 PROCEDURE — 99315 NF DSCHRG MGMT 30 MIN/LESS: CPT | Performed by: NURSE PRACTITIONER

## 2023-07-18 RX ORDER — HYDROMORPHONE HYDROCHLORIDE 2 MG/1
2 TABLET ORAL EVERY 6 HOURS PRN
Qty: 10 TABLET | Refills: 0 | Status: SHIPPED | OUTPATIENT
Start: 2023-07-18

## 2023-07-18 RX ORDER — AMOXICILLIN 250 MG
2 CAPSULE ORAL 2 TIMES DAILY
COMMUNITY

## 2023-07-18 NOTE — PROGRESS NOTES
[facility] GERIATRICS DISCHARGE SUMMARY  PATIENT'S NAME: Sherrie L Swanson Behboudi  YOB: 1960  MEDICAL RECORD NUMBER:  [id number]  Place of Service where encounter took place:  [facility]) [[id number]]    PRIMARY CARE PROVIDER AND CLINIC RESPONSIBLE AFTER TRANSFER:   [facility], [address] / [facility] [address]    Non-[facility] Provider     Transferring providers: Danilo Unger NP, Dr. Gabe MD  Recent Hospitalization/ED:  [facility]  stay 6/26/23 to 7/6/23.  Date of SNF Admission: 7/6/23  Date of SNF (anticipated) Discharge: July 19, 2023  Discharged to: previous independent home  Cognitive Scores: SLUMS: 30/30  Physical Function: Ambulating >150 ft with FWW  DME: No new DME needed    CODE STATUS/ADVANCE DIRECTIVES DISCUSSION:  Full Code   ALLERGIES: Dust mites and Pcn [penicillins]    HPI  This is a 63 year old female PMH bipolar disorder and obesity hospitalized after fall and right leg pain, CT showed distal intra-articular femoral fracture now s/p ORIF 6/27. Pain managed with tylenol, neurontin, dilaudid and tramadol. ASA for DVT prophylaxis. Bipolar: on Depakote and Zoloft. ABLA Hgb/thrombocytopenia stable. Urinary retention resolved. Discharged to [facility] for rehab.     Summary of nursing facility stay:     Closed fracture of right femur with routine healing, unspecified fracture morphology, unspecified portion of femur, subsequent encounter  (S72.001D) Closed fracture of right hip with routine healing  Now advanced to WBAT, immobilizer discontinued, vit D 50,000 units weekly and 1000 units daily.  F/u with Dr Nichols on 8/9, Joyce LAINEZ on 8/15 and Dexa scan on 10/31 per ortho     (R52) Pain  Continue Tylenol 1000 mg 3 times daily, Neurontin 400 mg at bedtime, Dilaudid 2mg every 6 hours as needed (2-3x/day) and encourage icing     (Z79.01) Anticoagulated  Continue  mg daily until 8/18     (F31.62) Bipolar  disorder, current episode mixed, moderate (H)  (F51.01) Primary insomnia  Chronic, stable with PTA Depakote ER 1000 mg HS, Zoloft 200 mg daily with trazodone 50 mg at bedtime     Renal function  Last CR 0.47 with GFR >80 on 7/6     (D62) ABLA (acute blood loss anemia)  Thrombocytopenia (H)  Last Hgb 9.6 on 7/11. Continue folic acid 1 mg daily     (R33.9) Urinary retention  Resolved     (K59.03) Drug-induced constipation  Using MiraLAX BID with senna S 2 tablets twice daily, prone to constipation, gave suppositories/enema during stay    Discharge Medications:    Current Outpatient Medications   Medication Sig Dispense Refill     senna-docusate (SENOKOT-S/PERICOLACE) 8.6-50 MG tablet Take 2 tablets by mouth 2 times daily       acetaminophen (TYLENOL) 500 MG tablet Take 1,000 mg by mouth 3 times daily       albuterol (PROAIR HFA/PROVENTIL HFA/VENTOLIN HFA) 108 (90 Base) MCG/ACT inhaler Inhale 2 puffs into the lungs every 4 hours as needed for shortness of breath, wheezing or cough       aspirin 81 MG EC tablet Take 162 mg by mouth daily Complete on 8/18       Calcium Carb-Cholecalciferol (CALCIUM 600 + D PO) Take 600 mg by mouth 2 times daily       divalproex sodium extended-release (DEPAKOTE ER) 500 MG 24 hr tablet Take 1,000 mg by mouth At Bedtime       folic acid (FOLVITE) 1 MG tablet Take 1 mg by mouth daily       gabapentin (NEURONTIN) 400 MG capsule Take 400 mg by mouth At Bedtime       HYDROmorphone (DILAUDID) 2 MG tablet Take 2 mg by mouth every 6 hours as needed for breakthrough pain       polyethylene glycol (MIRALAX) 17 g packet Take 17 g by mouth 2 times daily       sertraline (ZOLOFT) 100 MG tablet Take 200 mg by mouth daily       traZODone (DESYREL) 50 MG tablet Take 50 mg by mouth At Bedtime       vitamin B-12 (CYANOCOBALAMIN) 1000 MCG tablet Take 1,000 mcg by mouth daily       vitamin D3 (CHOLECALCIFEROL) 1.25 MG (60337 UT) capsule Take 50,000 Units by mouth every 7 days       Vitamin D3  "(CHOLECALCIFEROL) 25 mcg (1000 units) tablet Take 1 tablet by mouth daily       Controlled medications:   Medication dilaudid 2mg - 10 tabs electronically prescribed to [facility] pharmacy     Past Medical History: No past medical history on file.  Physical Exam:   Vitals: /69   Pulse 89   Temp 97.7  F (36.5  C)   Resp 20   Ht 1.727 m (5' 8\")   Wt 103.6 kg (228 lb 6.4 oz)   SpO2 97%   BMI 34.73 kg/m    BMI: Body mass index is 34.73 kg/m .  GENERAL APPEARANCE:  Alert, cooperative, pain controlled  RESP:  respiratory effort normal, no chest wall tenderness, no respiratory distress, CTA, RA  CV:  Auscultation of heart done, rate and rhythm controlled and regular, no murmur, no rub or gallop. No edema  ABD: hypoactive BSs, has constipation  PSYCH:  A/O x3, SLUMS 30/30. Flat affect    SNF labs:   Most Recent 3 CBC's:Recent Labs   Lab Test 07/11/23  0707 10/25/18  0920   WBC 4.5 9.3   HGB 9.6* 12.7   MCV 97 84    191     Most Recent 3 BMP's:Recent Labs   Lab Test 10/25/18  0920      POTASSIUM 4.0   CHLORIDE 103   CO2 27   BUN 10   CR 0.62   ANIONGAP 9   THOM 9.4   GLC 95       DISCHARGE PLAN:    Follow up labs: No labs orders/due    Medical Follow Up:      Follow up with primary care provider in 1-2 weeks    [facility] scheduled appointments:       Discharge Services: Out Patient:  physical therapy and occupational therapy    Discharge Instructions Verbalized to Patient at Discharge:     None    TOTAL DISCHARGE TIME:   Less than or equal to 30 minutes  Electronically signed by:  Danilo Unger NP                 "

## 2025-03-30 ENCOUNTER — HOSPITAL ENCOUNTER (OUTPATIENT)
Facility: CLINIC | Age: 65
Setting detail: OBSERVATION
Discharge: HOME | End: 2025-03-31
Attending: EMERGENCY MEDICINE | Admitting: PSYCHIATRY & NEUROLOGY

## 2025-03-30 DIAGNOSIS — F41.9 ANXIETY: ICD-10-CM

## 2025-03-30 DIAGNOSIS — F39 MOOD DISORDER: ICD-10-CM

## 2025-03-30 DIAGNOSIS — R82.81 PYURIA: ICD-10-CM

## 2025-03-30 LAB
ALBUMIN UR-MCNC: 20 MG/DL
ANION GAP SERPL CALCULATED.3IONS-SCNC: 14 MMOL/L (ref 7–15)
APPEARANCE UR: CLEAR
BASOPHILS # BLD AUTO: 0 10E3/UL (ref 0–0.2)
BASOPHILS NFR BLD AUTO: 1 %
BILIRUB UR QL STRIP: NEGATIVE
BUN SERPL-MCNC: 10.7 MG/DL (ref 8–23)
CALCIUM SERPL-MCNC: 9.8 MG/DL (ref 8.8–10.4)
CHLORIDE SERPL-SCNC: 97 MMOL/L (ref 98–107)
COLOR UR AUTO: YELLOW
CREAT SERPL-MCNC: 0.56 MG/DL (ref 0.51–0.95)
EGFRCR SERPLBLD CKD-EPI 2021: >90 ML/MIN/1.73M2
EOSINOPHIL # BLD AUTO: 0.2 10E3/UL (ref 0–0.7)
EOSINOPHIL NFR BLD AUTO: 3 %
ERYTHROCYTE [DISTWIDTH] IN BLOOD BY AUTOMATED COUNT: 13.5 % (ref 10–15)
GLUCOSE SERPL-MCNC: 113 MG/DL (ref 70–99)
GLUCOSE UR STRIP-MCNC: NEGATIVE MG/DL
HCO3 SERPL-SCNC: 23 MMOL/L (ref 22–29)
HCT VFR BLD AUTO: 41.5 % (ref 35–47)
HGB BLD-MCNC: 14.2 G/DL (ref 11.7–15.7)
HGB UR QL STRIP: NEGATIVE
HOLD SPECIMEN: NORMAL
IMM GRANULOCYTES # BLD: 0 10E3/UL
IMM GRANULOCYTES NFR BLD: 0 %
KETONES UR STRIP-MCNC: 60 MG/DL
LEUKOCYTE ESTERASE UR QL STRIP: ABNORMAL
LYMPHOCYTES # BLD AUTO: 1.6 10E3/UL (ref 0.8–5.3)
LYMPHOCYTES NFR BLD AUTO: 23 %
MCH RBC QN AUTO: 29.7 PG (ref 26.5–33)
MCHC RBC AUTO-ENTMCNC: 34.2 G/DL (ref 31.5–36.5)
MCV RBC AUTO: 87 FL (ref 78–100)
MONOCYTES # BLD AUTO: 0.6 10E3/UL (ref 0–1.3)
MONOCYTES NFR BLD AUTO: 8 %
MUCOUS THREADS #/AREA URNS LPF: PRESENT /LPF
NEUTROPHILS # BLD AUTO: 4.4 10E3/UL (ref 1.6–8.3)
NEUTROPHILS NFR BLD AUTO: 65 %
NITRATE UR QL: NEGATIVE
NRBC # BLD AUTO: 0 10E3/UL
NRBC BLD AUTO-RTO: 0 /100
PH UR STRIP: 6 [PH] (ref 5–7)
PLATELET # BLD AUTO: 223 10E3/UL (ref 150–450)
POTASSIUM SERPL-SCNC: 3.6 MMOL/L (ref 3.4–5.3)
RBC # BLD AUTO: 4.78 10E6/UL (ref 3.8–5.2)
RBC URINE: 2 /HPF
SODIUM SERPL-SCNC: 134 MMOL/L (ref 135–145)
SP GR UR STRIP: 1.02 (ref 1–1.03)
SQUAMOUS EPITHELIAL: 1 /HPF
TROPONIN T SERPL HS-MCNC: 18 NG/L
TROPONIN T SERPL HS-MCNC: 22 NG/L
UROBILINOGEN UR STRIP-MCNC: NORMAL MG/DL
WBC # BLD AUTO: 6.8 10E3/UL (ref 4–11)
WBC URINE: 26 /HPF

## 2025-03-30 PROCEDURE — 250N000013 HC RX MED GY IP 250 OP 250 PS 637: Performed by: EMERGENCY MEDICINE

## 2025-03-30 PROCEDURE — 96360 HYDRATION IV INFUSION INIT: CPT

## 2025-03-30 PROCEDURE — 84484 ASSAY OF TROPONIN QUANT: CPT | Performed by: EMERGENCY MEDICINE

## 2025-03-30 PROCEDURE — 80048 BASIC METABOLIC PNL TOTAL CA: CPT | Performed by: EMERGENCY MEDICINE

## 2025-03-30 PROCEDURE — 85025 COMPLETE CBC W/AUTO DIFF WBC: CPT | Performed by: EMERGENCY MEDICINE

## 2025-03-30 PROCEDURE — 250N000013 HC RX MED GY IP 250 OP 250 PS 637: Performed by: NURSE PRACTITIONER

## 2025-03-30 PROCEDURE — 36415 COLL VENOUS BLD VENIPUNCTURE: CPT | Performed by: EMERGENCY MEDICINE

## 2025-03-30 PROCEDURE — 87086 URINE CULTURE/COLONY COUNT: CPT | Performed by: EMERGENCY MEDICINE

## 2025-03-30 PROCEDURE — G0378 HOSPITAL OBSERVATION PER HR: HCPCS

## 2025-03-30 PROCEDURE — 99285 EMERGENCY DEPT VISIT HI MDM: CPT | Mod: 25

## 2025-03-30 PROCEDURE — 93005 ELECTROCARDIOGRAM TRACING: CPT

## 2025-03-30 PROCEDURE — 85004 AUTOMATED DIFF WBC COUNT: CPT | Performed by: EMERGENCY MEDICINE

## 2025-03-30 PROCEDURE — 99223 1ST HOSP IP/OBS HIGH 75: CPT | Mod: AI | Performed by: NURSE PRACTITIONER

## 2025-03-30 PROCEDURE — 81001 URINALYSIS AUTO W/SCOPE: CPT | Performed by: EMERGENCY MEDICINE

## 2025-03-30 PROCEDURE — 258N000003 HC RX IP 258 OP 636: Performed by: EMERGENCY MEDICINE

## 2025-03-30 RX ORDER — HYDROXYZINE HYDROCHLORIDE 25 MG/1
TABLET, FILM COATED ORAL
COMMUNITY
Start: 2025-03-27

## 2025-03-30 RX ORDER — TRAZODONE HYDROCHLORIDE 50 MG/1
50 TABLET ORAL
Status: DISCONTINUED | OUTPATIENT
Start: 2025-03-30 | End: 2025-03-31 | Stop reason: HOSPADM

## 2025-03-30 RX ORDER — CEPHALEXIN 500 MG/1
500 CAPSULE ORAL ONCE
Status: DISCONTINUED | OUTPATIENT
Start: 2025-03-30 | End: 2025-03-30

## 2025-03-30 RX ORDER — HYDROXYZINE HYDROCHLORIDE 25 MG/1
25 TABLET, FILM COATED ORAL EVERY 6 HOURS PRN
Status: DISCONTINUED | OUTPATIENT
Start: 2025-03-30 | End: 2025-03-30

## 2025-03-30 RX ORDER — HYDROXYZINE HCL 25 MG
12.5-25 TABLET ORAL EVERY 6 HOURS PRN
Status: DISCONTINUED | OUTPATIENT
Start: 2025-03-30 | End: 2025-03-31 | Stop reason: HOSPADM

## 2025-03-30 RX ORDER — DICYCLOMINE HYDROCHLORIDE 10 MG/1
10 CAPSULE ORAL 3 TIMES DAILY
Status: DISCONTINUED | OUTPATIENT
Start: 2025-03-30 | End: 2025-03-31 | Stop reason: HOSPADM

## 2025-03-30 RX ORDER — CEPHALEXIN 500 MG/1
500 CAPSULE ORAL 2 TIMES DAILY
Qty: 10 CAPSULE | Refills: 0 | Status: SHIPPED | OUTPATIENT
Start: 2025-03-30 | End: 2025-04-04

## 2025-03-30 RX ORDER — ACETAMINOPHEN 325 MG/1
650 TABLET ORAL EVERY 6 HOURS PRN
Status: DISCONTINUED | OUTPATIENT
Start: 2025-03-30 | End: 2025-03-31 | Stop reason: HOSPADM

## 2025-03-30 RX ORDER — GABAPENTIN 100 MG/1
200 CAPSULE ORAL 3 TIMES DAILY PRN
Status: DISCONTINUED | OUTPATIENT
Start: 2025-03-30 | End: 2025-03-31 | Stop reason: HOSPADM

## 2025-03-30 RX ORDER — ESCITALOPRAM OXALATE 10 MG/1
TABLET ORAL
COMMUNITY
Start: 2025-03-18 | End: 2025-03-31

## 2025-03-30 RX ORDER — CEPHALEXIN 500 MG/1
500 CAPSULE ORAL EVERY 12 HOURS SCHEDULED
Status: DISCONTINUED | OUTPATIENT
Start: 2025-03-30 | End: 2025-03-31 | Stop reason: HOSPADM

## 2025-03-30 RX ORDER — DIVALPROEX SODIUM 500 MG/1
500 TABLET, FILM COATED, EXTENDED RELEASE ORAL AT BEDTIME
Status: DISCONTINUED | OUTPATIENT
Start: 2025-03-30 | End: 2025-03-31 | Stop reason: HOSPADM

## 2025-03-30 RX ORDER — VITAMIN B COMPLEX
25 TABLET ORAL DAILY
Status: DISCONTINUED | OUTPATIENT
Start: 2025-03-31 | End: 2025-03-31 | Stop reason: HOSPADM

## 2025-03-30 RX ORDER — MIRTAZAPINE 7.5 MG/1
7.5 TABLET, FILM COATED ORAL AT BEDTIME
Status: DISCONTINUED | OUTPATIENT
Start: 2025-03-30 | End: 2025-03-31 | Stop reason: HOSPADM

## 2025-03-30 RX ORDER — ESCITALOPRAM OXALATE 5 MG/1
5 TABLET ORAL DAILY
Status: DISCONTINUED | OUTPATIENT
Start: 2025-03-31 | End: 2025-03-31 | Stop reason: HOSPADM

## 2025-03-30 RX ADMIN — DICYCLOMINE HYDROCHLORIDE 10 MG: 10 CAPSULE ORAL at 23:27

## 2025-03-30 RX ADMIN — CEPHALEXIN 500 MG: 500 CAPSULE ORAL at 20:27

## 2025-03-30 RX ADMIN — MIRTAZAPINE 7.5 MG: 7.5 TABLET, FILM COATED ORAL at 23:27

## 2025-03-30 RX ADMIN — SODIUM CHLORIDE 1000 ML: 0.9 INJECTION, SOLUTION INTRAVENOUS at 18:59

## 2025-03-30 RX ADMIN — TRAZODONE HYDROCHLORIDE 50 MG: 50 TABLET ORAL at 23:40

## 2025-03-30 RX ADMIN — DIVALPROEX SODIUM 500 MG: 500 TABLET, FILM COATED, EXTENDED RELEASE ORAL at 23:27

## 2025-03-30 ASSESSMENT — ACTIVITIES OF DAILY LIVING (ADL)
ADLS_ACUITY_SCORE: 42

## 2025-03-30 ASSESSMENT — COLUMBIA-SUICIDE SEVERITY RATING SCALE - C-SSRS
6. HAVE YOU EVER DONE ANYTHING, STARTED TO DO ANYTHING, OR PREPARED TO DO ANYTHING TO END YOUR LIFE?: NO
2. HAVE YOU ACTUALLY HAD ANY THOUGHTS OF KILLING YOURSELF IN THE PAST MONTH?: NO
1. IN THE PAST MONTH, HAVE YOU WISHED YOU WERE DEAD OR WISHED YOU COULD GO TO SLEEP AND NOT WAKE UP?: NO

## 2025-03-30 NOTE — ED NOTES
Pt presents to room reporting worsening palpitations with shaking and lightheadedness for the past 7 days. Pt reports the symptoms are intermittent but episodes last for hours at a time. Pt denies chest pain or SOB with her symptoms.

## 2025-03-30 NOTE — ED TRIAGE NOTES
"Pt states she feels \"shaky\", and states she has been having palpitations and diaphoresis for the last 2 weeks.  Pt seen at  [facility] for these symptoms yesterday.        "

## 2025-03-30 NOTE — ED PROVIDER NOTES
"  Emergency Department Note      History of Present Illness     Chief Complaint   Palpitations    HPI   Sherrie L Swanson Behboudi is a 65 year old female with a history of Bipolar 1 disorder, anxiety, depression, and hyperlipidemia who presents to the ED for anxiety. On 3/18/2025, the patient was recommended to begin changing medications from sertraline to escitalopram, tapering the sertraline, while gradually increasing her escitalopram. She had been on sertraline for a long time, but was switched due to sudden onset, increased anxiety symptoms. During the switch, she felt \"terrible,\" endorsing lightheadedness, shakiness, and intermittent diaphoresis, as well as increased confusion. She also has been experiencing frequent palpitations and nausea. She has not been sleeping well due to her palpitations. She denies chest pain or urinary symptoms. No other body pain. No recent syncopal episodes. Of note, the patient was seen yesterday at [facility] ER. She had a largely negative work up. They recommend taking an additional hydroxyzine before bed, as well as dunking her face in ice water for anxiety symptoms. She did ice her neck last night which offered some relief. Her symptoms have otherwise not changed since her visit. Following her ED visit, she had concern for her valproic acid level, as well as her elevated (160 systolic) blood pressure reading. Her valproic acid was recently decreased from 1000 mg to 500 mg two months ago due to recent weight loss. She has not missed any doses. She does take it in the morning, and her blood draw was around 1800.    Independent Historian   None    Review of External Notes   I reviewed the emergency department note from [facility] from yesterday:  Sherrie L Swanson Behboudi is a 65 y.o. female who comes in for symptoms of anxiety and palpitations. Workup here is reassuring and she is found to be in sinus rhythm, no ectopy seen monitored telemetry. BMP, CBC normal. Valproic " acid level as above. Patient will follow up with her prescribing psychiatrist as well as her therapist. Patient was reassured I am unable to identify cardiac or other medical cause for her symptoms.     Past Medical History   Medical History and Problem List   Colon polyp  Bipolar 1 disorder  Depression  Anxiety  Hyperlipidemia  Osteoporotic femur fracture  Vitamin D deficiency  Thrombocytopenic disorder  Insomnia    Medications   Dicyclomine  Escitalopram  Hydroxyzine  Albuterol  Aspirin 81 mg  Sertraline  Trazodone  Valproic acid    Surgical History   Appendectomy  ORIF femur decompression    Physical Exam   Patient Vitals for the past 24 hrs:   BP Temp Pulse Resp SpO2   03/30/25 1647 (!) 171/90 98.8  F (37.1  C) 94 22 99 %     Physical Exam  General: Well appearing, nontoxic.  Resting comfortably  Head:  Scalp, face, and head appear normal  Eyes:  Pupils are equal, round    Conjunctivae non-injected and sclerae white  ENT:    The external nose is normal    Pinnae are normal  Neck:  Normal range of motion    There is no rigidity noted    Trachea is in the midline  CV:  Regular rate and rhythm     Normal S1/S2, no S3/S4    No murmur or rub. Radial pulses 2+ bilaterally.  Resp:  Lungs are clear and equal bilaterally  There is no tachypnea    No increased work of breathing    No rales, wheezing, or rhonchi  GI:  Abdomen is soft, no rigidity or guarding    No distension, or mass    No tenderness or rebound tenderness   MS:  Normal muscular tone    Symmetric motor strength    No lower extremity edema  Skin:  No rash or acute skin lesions noted  Neuro:  Awake and alert. No facial droop. Strength grossly intact. No tremors.   Speech is normal and fluent  Moves all extremities spontaneously  Psych: Anxious affect. Appropriate interactions. Anxious mood. No SI/HI. No evidence of response to internal stimuli. No psychomotor agitation.    Diagnostics   Lab Results   Labs Ordered and Resulted from Time of ED Arrival to Time  of ED Departure   BASIC METABOLIC PANEL - Abnormal       Result Value    Sodium 134 (*)     Potassium 3.6      Chloride 97 (*)     Carbon Dioxide (CO2) 23      Anion Gap 14      Urea Nitrogen 10.7      Creatinine 0.56      GFR Estimate >90      Calcium 9.8      Glucose 113 (*)    TROPONIN T, HIGH SENSITIVITY - Abnormal    Troponin T, High Sensitivity 22 (*)    TROPONIN T, HIGH SENSITIVITY - Abnormal    Troponin T, High Sensitivity 18 (*)    ROUTINE UA WITH MICROSCOPIC REFLEX TO CULTURE - Abnormal    Color Urine Yellow      Appearance Urine Clear      Glucose Urine Negative      Bilirubin Urine Negative      Ketones Urine 60 (*)     Specific Gravity Urine 1.023      Blood Urine Negative      pH Urine 6.0      Protein Albumin Urine 20 (*)     Urobilinogen Urine Normal      Nitrite Urine Negative      Leukocyte Esterase Urine Moderate (*)     Mucus Urine Present (*)     RBC Urine 2      WBC Urine 26 (*)     Squamous Epithelials Urine 1     CBC WITH PLATELETS AND DIFFERENTIAL    WBC Count 6.8      RBC Count 4.78      Hemoglobin 14.2      Hematocrit 41.5      MCV 87      MCH 29.7      MCHC 34.2      RDW 13.5      Platelet Count 223      % Neutrophils 65      % Lymphocytes 23      % Monocytes 8      % Eosinophils 3      % Basophils 1      % Immature Granulocytes 0      NRBCs per 100 WBC 0      Absolute Neutrophils 4.4      Absolute Lymphocytes 1.6      Absolute Monocytes 0.6      Absolute Eosinophils 0.2      Absolute Basophils 0.0      Absolute Immature Granulocytes 0.0      Absolute NRBCs 0.0     URINE CULTURE     Imaging   No orders to display     EKG   ECG taken at 1657, ECG read at 1809   Normal sinus rhythm  Nonspecific ST abnormality  Abnormal ECG  Rate 91 bpm. OK interval 148 ms. QRS duration 76 ms. QT/QTc 352/432 ms. P-R-T axes 89 87 86.  Dereje Juarez MD     Independent Interpretation   None    ED Course    Medications Administered   Medications   cephALEXin (KEFLEX) capsule 500 mg (has no administration in time  range)   sodium chloride 0.9% BOLUS 1,000 mL (0 mLs Intravenous Stopped 3/30/25 2002)     Procedures   Procedures     Discussion of Management   None    ED Course   ED Course as of 03/30/25 2018   Sun Mar 30, 2025   1809 I obtained history and examined the patient as noted above.     2001 I rechecked and updated the patient.       Additional Documentation  None    Medical Decision Making / Diagnosis   CMS Diagnoses: None    MIPS       None    MDM   Sherrie L Swanson Behboudi is a 65 year old female with history of bipolar disorder and anxiety who was seen at [facility] emergency department yesterday presents again to the ER today here at [facility] for persistent symptoms of palpitations and other symptoms as documented above after recent significant changes to her medications.  She was tapered off of sertraline and recently started escitalopram and also cut her valproic acid in half.  Since then she has decline in her functioning with significant anxiety symptoms as well as somatic symptoms documented above.  On my evaluation she is well-appearing, hemodynamically stable and afebrile.  I reviewed the results from her workup yesterday at [facility].  There were no concerning findings.  EKG here shows sinus rhythm with no evidence of ischemia, dysrhythmia or any other concerning morphology.  Symptoms are not consistent with any acute cardiopulmonary process.  No evidence for stroke or primary CNS process.  CBC is unremarkable.  No anemia or leukocytosis.  Basic metabolic panel is reassuring.  No hyper or hypoglycemia.  A high-sensitivity troponin level was sent from triage based on nursing protocol.  Her overall clinical presentation and symptoms are not consistent with acute coronary syndrome or myopericarditis.  We will trend this to ensure it is not uptrending however in this situation it is of little clinical significance.  UA with pyuria concerning for UTI. Will treat with Keflex 500mg BID x 5 days. Urine  culture pending. No evidence of systemic infection. Renal function normal. Ultimately patient presents with decompensated anxiety and mental illness due to recent medication changes.  She is medically cleared for evaluation in [facility] to help develop a stabilization plan for her predominantly psychiatric symptoms at this time.  Patient was transferred to [facility] in stable condition.    Disposition   The patient was transferred to [facility].     Diagnosis     ICD-10-CM    1. Palpitations  R00.2       2. Anxiety state  F41.1       3. Pyuria  R82.81          Discharge Medications   New Prescriptions    CEPHALEXIN (KEFLEX) 500 MG CAPSULE    Take 1 capsule (500 mg) by mouth 2 times daily for 5 days.     Scribe Disclosure:  Neel FLORES, am serving as a scribe at 6:22 PM on 3/30/2025 to document services personally performed by Dereje Juarez MD based on my observations and the provider's statements to me.        Dereje Juarez MD  03/30/25 2018

## 2025-03-31 VITALS
SYSTOLIC BLOOD PRESSURE: 156 MMHG | BODY MASS INDEX: 25.87 KG/M2 | HEIGHT: 67 IN | RESPIRATION RATE: 16 BRPM | TEMPERATURE: 98.3 F | HEART RATE: 86 BPM | OXYGEN SATURATION: 98 % | WEIGHT: 164.8 LBS | DIASTOLIC BLOOD PRESSURE: 82 MMHG

## 2025-03-31 LAB
ATRIAL RATE - MUSE: 91 BPM
DIASTOLIC BLOOD PRESSURE - MUSE: NORMAL MMHG
INTERPRETATION ECG - MUSE: NORMAL
P AXIS - MUSE: 89 DEGREES
PR INTERVAL - MUSE: 148 MS
QRS DURATION - MUSE: 76 MS
QT - MUSE: 352 MS
QTC - MUSE: 432 MS
R AXIS - MUSE: 87 DEGREES
SYSTOLIC BLOOD PRESSURE - MUSE: NORMAL MMHG
T AXIS - MUSE: 86 DEGREES
VENTRICULAR RATE- MUSE: 91 BPM

## 2025-03-31 PROCEDURE — 250N000013 HC RX MED GY IP 250 OP 250 PS 637: Performed by: NURSE PRACTITIONER

## 2025-03-31 PROCEDURE — 250N000013 HC RX MED GY IP 250 OP 250 PS 637: Performed by: EMERGENCY MEDICINE

## 2025-03-31 PROCEDURE — 99235 HOSP IP/OBS SAME DATE MOD 70: CPT | Performed by: PSYCHIATRY & NEUROLOGY

## 2025-03-31 PROCEDURE — 250N000013 HC RX MED GY IP 250 OP 250 PS 637: Performed by: STUDENT IN AN ORGANIZED HEALTH CARE EDUCATION/TRAINING PROGRAM

## 2025-03-31 PROCEDURE — G0378 HOSPITAL OBSERVATION PER HR: HCPCS

## 2025-03-31 RX ORDER — MIRTAZAPINE 7.5 MG/1
7.5 TABLET, FILM COATED ORAL AT BEDTIME
Qty: 30 TABLET | Refills: 0 | Status: SHIPPED | OUTPATIENT
Start: 2025-03-31

## 2025-03-31 RX ORDER — ESCITALOPRAM OXALATE 5 MG/1
5 TABLET ORAL DAILY
Qty: 30 TABLET | Refills: 0 | Status: SHIPPED | OUTPATIENT
Start: 2025-03-31

## 2025-03-31 RX ORDER — LOPERAMIDE HYDROCHLORIDE 2 MG/1
2 CAPSULE ORAL 4 TIMES DAILY PRN
Status: DISCONTINUED | OUTPATIENT
Start: 2025-03-31 | End: 2025-03-31 | Stop reason: HOSPADM

## 2025-03-31 RX ADMIN — Medication 25 MCG: at 08:38

## 2025-03-31 RX ADMIN — DICYCLOMINE HYDROCHLORIDE 10 MG: 10 CAPSULE ORAL at 08:40

## 2025-03-31 RX ADMIN — LOPERAMIDE HYDROCHLORIDE 2 MG: 2 CAPSULE ORAL at 00:09

## 2025-03-31 RX ADMIN — CEPHALEXIN 500 MG: 500 CAPSULE ORAL at 08:40

## 2025-03-31 RX ADMIN — ACETAMINOPHEN 650 MG: 325 TABLET, FILM COATED ORAL at 03:45

## 2025-03-31 RX ADMIN — ESCITALOPRAM OXALATE 5 MG: 5 TABLET, FILM COATED ORAL at 08:38

## 2025-03-31 ASSESSMENT — ACTIVITIES OF DAILY LIVING (ADL)
ADLS_ACUITY_SCORE: 42

## 2025-03-31 ASSESSMENT — COLUMBIA-SUICIDE SEVERITY RATING SCALE - C-SSRS
TOTAL  NUMBER OF ABORTED OR SELF INTERRUPTED ATTEMPTS SINCE LAST CONTACT: NO
TOTAL  NUMBER OF INTERRUPTED ATTEMPTS SINCE LAST CONTACT: NO
6. HAVE YOU EVER DONE ANYTHING, STARTED TO DO ANYTHING, OR PREPARED TO DO ANYTHING TO END YOUR LIFE?: NO
SUICIDE, SINCE LAST CONTACT: NO
2. HAVE YOU ACTUALLY HAD ANY THOUGHTS OF KILLING YOURSELF?: NO
ATTEMPT SINCE LAST CONTACT: NO
1. SINCE LAST CONTACT, HAVE YOU WISHED YOU WERE DEAD OR WISHED YOU COULD GO TO SLEEP AND NOT WAKE UP?: NO

## 2025-03-31 NOTE — PROGRESS NOTES
"Triage and Transition Services Extended Care Reassessment     Patient: Eveline goes by \"Eveline,\" uses she/her pronouns  Date of Service: March 31, 2025  Site of Service: [facility] Emergency Dept                             [id number]  Patient was seen yes  Mode of Assessment: In person     Reason for Reassessment: other (see comment) (Anxiety)    History of Patient's Original Emergency Room Encounter: Patient has a psychiatric history notable for bipolar disorder and anxiety. Pt does not agree with her bipolar disorder, stating she has never had a manic episode. Her mother carries a bipolar disorder. She stated that her mother also had a UTI at pt's current age, and showed similar symptoms.     Patient stated that she was laid off in March 2024, which has been very stressful for her. Pt is employment seeking, but has been having difficultly with concentration due to recent onset of confusion. [employer]'s election and other world events have also been stressful for her. However, she stated that she had been able to manage her symptoms, until two weeks ago. She stated that she normally sleeps well with sleep medications. However in the last two weeks, she has been waking up in the middle of the night sweaty and with heart palpitations. Pt also stopped smoking marijuana two weeks ago as it exacerbated her anxiety symptoms. Patient's outpatient psychiatrist made recent medication changes including stopping Zoloft and starting Lexapro. Pt stated that she has an established outpatient therapist that she enjoys meeting with. She lives with her daughter, who she finds to be very supportive.    Current Patient Presentation: Calm, cooperative, engaged    Presentation Summary: Pt was awake and sitting in milieu when approached by writer. Pt was agreeable to meet. Pt reports she came to the ED for anxiety as she was experiencing difficulty concentrating, insomnia, and feeling \"not calm\". Pt states that she " "feels \"better\" compared to time of admission and last night she was able to get much better sleep. Additionally, Pt vocalized a recent medication change she feels may have contributed to an exacerbation of her symptoms. Pt denied any SI/HI/AH/VH.    Changes Observed Since Initial Assessment: decrease in presenting symptoms    Therapeutic Interventions Provided: Engaged in guided discovery, explored patient's perspectives and helped expand them through socratic dialogue.    Current Symptoms: anxious difficulty concentrating anxious  (None reported)  (None reported)    Mental Status Exam   Affect: Appropriate  Appearance: Appropriate  Attention Span/Concentration: Attentive  Eye Contact: Engaged    Fund of Knowledge: Appropriate   Language /Speech Content: Fluent  Language /Speech Volume: Normal  Language /Speech Rate/Productions: Normal  Recent Memory: Intact  Remote Memory: Intact  Mood: Normal  Orientation to Person: Yes   Orientation to Place: Yes  Orientation to Time of Day: Yes  Orientation to Date: Yes     Situation (Do they understand why they are here?): Yes  Psychomotor Behavior: Normal  Thought Content: Clear  Thought Form: Intact    Treatment Objective(s) Addressed:      Patient Response to Interventions: acceptance expressed, verbalizes understanding    Progress Towards Goals:  Patient Reports Symptoms Are: improving  Patient Progress Toward Goals: is making progress  Comment: Pt does appear to be making progress with anxiety symptoms. Pt reports getting a good night of sleep while on [facility] and Pt endorsed feeling an improvement in anxiety symptoms.  Next Step to Work Toward Discharge: patient ability to engage in safety planning  Ability to Engage Comment: Pt was able to engage in safety planning. Pt identified coping skills, safe people, and safe places she can utilize in the future when experiencing an increase in anxiety symptoms.    Case Management:      C-SSRS Since Last Contact:   1. Wish to be " "Dead (Since Last Contact): No  2. Non-Specific Active Suicidal Thoughts (Since Last Contact): No     Actual Attempt (Since Last Contact): No  Has subject engaged in non-suicidal self-injurious behavior? (Since Last Contact): No  Interrupted Attempts (Since Last Contact): No  Aborted or Self-Interrupted Attempt (Since Last Contact): No  Preparatory Acts or Behavior (Since Last Contact): No  Suicide (Since Last Contact): No     Calculated C-SSRS Risk Score (Since Last Contact): No Risk Indicated    Plan: Final Disposition / Recommended Care Path: discharge  Plan for Care reviewed with assigned Medical Provider: yes  Plan for Care Team Review: provider  Comments: Dr. Lopez  Patient and/or validated legal guardian concurs: yes    Clinical Substantiation: It is the recommendation of this writer that the Pt discharge back to the community. Pt reports a reduction in anxiety symptoms of trouble concentrating, insomnia, and feeling \"not calm\". Pt says she was able to get good sleep last night, which is helpful. Pt endorsed a recent medication change she believes contributed to an exacerbation of symptoms. Pt feels confident to discharge. Pt is currently followed by OP therapy and psychiatry. Pt stated she \"doesn't love\" her psychiatrist. Writer offered to connect Pt with new psychiatrist, but Pt said \"I'll wait\". Pt was able to safety plan with writer. Pt identified going for a walk and watching TV as things she can do to cope the next time she is feeling an increase in her anxiety. Pt endorsed her two daughters as supportive individuals. Pt was able to effectively safety plan. Pt denied SI/HI/AH/VH. Based on the Pt's current presentation, reduction of anxiety symptoms, and increased sleep discharge home is the lest restrictive level of care. It appears [facility] has been helpful in aiding the Pt's current mental health crisis.    Legal Status: Legal Status: Voluntary/Patient has signed consent for treatment    Session Status: " Time session started: 0849  Time session ended: 0859  Session Duration (minutes): 10 minutes  Session Number: 2  Anticipated number of sessions or this episode of care: 2    Session Start Time: 0849  Session Stop Time: 0859  CPT codes: Non-Billable  Time Spent: 10 minutes      CPT code(s) utilized: Non-Billable    Diagnosis:   Patient Active Problem List   Diagnosis Code    Acute appendicitis with localized peritonitis K35.30    Closed fracture of right hip with routine healing S72.001D    Pain R52    Anticoagulated Z79.01    Bipolar disorder, current episode mixed, moderate (H) F31.62    ABLA (acute blood loss anemia) D62    Primary insomnia F51.01    Urinary retention R33.9    Drug-induced constipation K59.03    Mood disorder F39    Anxiety F41.9    Pyuria R82.81       Primary Problem This Admission: Active Hospital Problems    Mood disorder      *Anxiety      Pyuria        Reanna Wynn  MSW Intern

## 2025-03-31 NOTE — PROGRESS NOTES
Pt appeared to rest comfortably through the night in the recliner.  She was awake at the start of shift; requested an anti-diarrheal medication for intestinal cramps.  Pt woke once to use the restroom, complained of mild headache and was given PRN tylenol.  Reported anxiety 5/10, declined offer of interventions.  Denies SI. Even and unlabored respirations visualized during rounds, pt made occasional independent position changes through the night.

## 2025-03-31 NOTE — ED NOTES
[facility]  ED to EMPATH Checklist:      Goal for EMPATH: Anxiety management    Current Behavior: Calm    Safety Concerns: None    Legal Hold Status: Voluntary    Medically Cleared by ED provider: Yes    Patient Therapeutically Searched: Not searched - Currently in triage    Belongings: Remain with patient    Independent Ambulation at Baseline: Yes/No: Yes    Participates in Care/Conversation: Yes/No: Yes    Patient Informed about EMPATH: Yes/No: Yes    DEC: Ordered    Patient Ready to be Transferred to EMPATH? Yes/No: Yes

## 2025-03-31 NOTE — CONSULTS
Diagnostic Evaluation Consultation  Crisis Assessment    Patient Name: Sherrie L Swanson Behboudi  Age:  65 year old  Legal Sex: female  Gender Identity: female  Pronouns:   Race: White  Ethnicity: Not  or   Language: English      Patient was assessed: In person   Crisis Assessment Start Date: 03/30/25  Crisis Assessment Start Time: 2050  Crisis Assessment Stop Time: 2105  Patient location: [facility] Emergency Dept                             [id number]    Referral Data and Chief Complaint  Sherrie L Swanson Behboudi presents to the ED with family/friends. Patient is presenting to the ED for the following concerns: Anxiety. Factors that make the mental health crisis life threatening or complex are: Patient presented to the ED due to confusion and somatic symptoms of anxiety. During this ED visit, pt provided a urine sample which showed signs of a UTI. Pt stated that she was surprised by this result, but also relieved as she feels this would explain some of her symptoms..      Informed Consent and Assessment Methods  Explained the crisis assessment process, including applicable information disclosures and limits to confidentiality, assessed understanding of the process, and obtained consent to proceed with the assessment.  Assessment methods included conducting a formal interview with patient, review of medical records, collaboration with medical staff, and obtaining relevant collateral information from family and community providers when available.  : done     History of the Crisis   Patient has a psychiatric history notable for bipolar disorder and anxiety. Pt does not agree with her bipolar disorder, stating she has never had a manic episode. Her mother carries a bipolar disorder. She stated that her mother also had a UTI at pt's current age, and showed similar symptoms.    Patient stated that she was laid off in March 2024, which has been very stressful for her. Pt is employment  seeking, but has been having difficultly with concentration due to recent onset of confusion. President [place]'s election and other world events have also been stressful for her. However, she stated that she had been able to manage her symptoms, until two weeks ago. She stated that she normally sleeps well with sleep medications. However in the last two weeks, she has been waking up in the middle of the night sweaty and with heart palpitations. Pt also stopped smoking marijuana two weeks ago as it exacerbated her anxiety symptoms. Patient's outpatient psychiatrist made recent medication changes including stopping Zoloft and starting Lexapro. Pt stated that she has an established outpatient therapist that she enjoys meeting with. She lives with her daughter, who she finds to be very supportive.    Brief Psychosocial History  Family:  Single, Children yes  Support System:  Friend, Children  Employment Status:  unemployed, employment seeking (laid off March 2024)  Source of Income:   (unable to assess)  Financial Environmental Concerns:  unemployed  Current Hobbies:     Barriers in Personal Life:  mental health concerns    Significant Clinical History  Current Anxiety Symptoms:  anxious, shortness of breath or racing heart  Current Depression/Trauma:  difficulty concentrating, impaired decision making (confusion)  Current Somatic Symptoms:  anxious, somatic symptoms (abdominal pain, headache, tension), sweating, flushing, shaking, shortness of breath or racing heart  Current Psychosis/Thought Disturbance:     Current Eating Symptoms:     Chemical Use History:  Alcohol: None  Benzodiazepines: None  Opiates: None  Cocaine: None  Marijuana: None  Other Use: None   Past diagnosis:  Bipolar Disorder, Anxiety Disorder  Family history:  Bipolar Disorder (mother with bipolar)  Past treatment:  Individual therapy, Psychiatric Medication Management, Inpatient Hospitalization  Details of most recent treatment:  Patient has  established outpatient psychiatry and therapy. She reported one psych IP admission in 2004.  Other relevant history:       Have there been any medication changes in the past two weeks:  yes, please comment  Stopping Zoloft and starting Lexapro    Is the patient compliant with medications:  yes        Collateral Information  Is there collateral information: Yes     Collateral information name, relationship, phone number:  Anila Slaughter (friend) [phone] and Sunny (Daughter) [phone]    What happened today: The expressed concern that patient has had worsening physical symptoms of anxiety, similar to panic attacks.     What is different about patient's functioning: Sunny took patient to the [facility] ED yesterday. Pt was discharged without feeling any relief.       Has patient made comments about wanting to kill themselves/others: no    If d/c is recommended, can they take part in safety/aftercare planning: yes         Risk Assessment  [place] Suicide Severity Rating Scale Full Clinical Version:  Suicidal Ideation  Q1 Wish to be Dead (Lifetime): No  Q2 Non-Specific Active Suicidal Thoughts (Lifetime): No  Q6 Suicide Behavior (Lifetime): no  Intensity of Ideation (Lifetime)  Controllability (Lifetime): Does not attempt to control thoughts  Deterrents (Lifetime): Does not apply  Reasons for Ideation (Lifetime): Does not apply  Suicidal Behavior (Lifetime)  Actual Attempt (Lifetime): No  Has subject engaged in non-suicidal self-injurious behavior? (Lifetime): No  Interrupted Attempts (Lifetime): No  Aborted or Self-Interrupted Attempt (Lifetime): No  Preparatory Acts or Behavior (Lifetime): No    [place] Suicide Severity Rating Scale Recent:   Suicidal Ideation (Recent)  Q1 Wished to be Dead (Past Month): no  Q2 Suicidal Thoughts (Past Month): no  Level of Risk per Screen: no risks indicated  Intensity of Ideation (Recent)  Controllability (Past 1 Month): Does not attempt to control thoughts  Deterrents (Past  1 Month): Does not apply  Reasons for Ideation (Past 1 Month): Does not apply  Suicidal Behavior (Recent)  Actual Attempt (Past 3 Months): No  Has subject engaged in non-suicidal self-injurious behavior? (Past 3 Months): No  Interrupted Attempts (Past 3 Months): No  Aborted or Self-Interrupted Attempt (Past 3 Months): No  Preparatory Acts or Behavior (Past 3 Months): No    Environmental or Psychosocial Events: unemployment/underemployment, recent life events (see comment), other life stressors (UTI)  Protective Factors: Protective Factors: strong bond to family unit, community support, or employment, lives in a responsibly safe and stable environment, responsibilities and duties to others, including pets and children, good treatment engagement, sense of importance of health and wellness, able to access care without barriers, supportive ongoing medical and mental health care relationships, help seeking, optimistic outlook - identification of future goals, reality testing ability, good problem-solving, coping, and conflict resolution skills    Does the patient have thoughts of harming others? Feels Like Hurting Others: no  Previous Attempt to Hurt Others: no  Current presentation:  (calm and cooperative)  Is the patient engaging in sexually inappropriate behavior?: no  Does Patient have a known history of aggressive behavior: No  Has aggression occurred as a result of MH concerns/diagnosis: No  Does patient have history of aggression in hospital: No    Is the patient engaging in sexually inappropriate behavior?  no        Mental Status Exam   Affect: Appropriate  Appearance: Appropriate  Attention Span/Concentration: Attentive  Eye Contact: Engaged    Fund of Knowledge: Appropriate   Language /Speech Content: Fluent  Language /Speech Volume: Normal  Language /Speech Rate/Productions: Normal  Recent Memory: Intact  Remote Memory: Intact  Mood: Normal  Orientation to Person: Yes   Orientation to Place: Yes  Orientation  to Time of Day: Yes  Orientation to Date: Yes     Situation (Do they understand why they are here?): Yes  Psychomotor Behavior: Normal  Thought Content: Clear  Thought Form: Intact       Medication  Psychotropic medications:   Medication Orders - Psychiatric (From admission, onward)      Start     Dose/Rate Route Frequency Ordered Stop    03/31/25 0800  escitalopram (LEXAPRO) tablet 5 mg         5 mg Oral DAILY 03/30/25 2136 03/30/25 2200  mirtazapine (REMERON) tablet TABS 7.5 mg         7.5 mg Oral AT BEDTIME 03/30/25 2136 03/30/25 2146  traZODone (DESYREL) tablet 50 mg         50 mg Oral AT BEDTIME PRN 03/30/25 2147 03/30/25 2138  hydrOXYzine HCl (ATARAX) tablet 25 mg         25 mg Oral EVERY 6 HOURS PRN 03/30/25 2138               Current Care Team  Patient Care Team:  Crystal Kirby PA as PCP - General    Diagnosis  Patient Active Problem List   Diagnosis Code    Acute appendicitis with localized peritonitis K35.30    Closed fracture of right hip with routine healing S72.001D    Pain R52    Anticoagulated Z79.01    Bipolar disorder, current episode mixed, moderate (H) F31.62    ABLA (acute blood loss anemia) D62    Primary insomnia F51.01    Urinary retention R33.9    Drug-induced constipation K59.03    Mood disorder F39    Anxiety F41.9    Pyuria R82.81       Primary Problem This Admission  Active Hospital Problems    Mood disorder      *Anxiety      Pyuria        Clinical Summary and Substantiation of Recommendations   Clinical Substantiation:  Patient presented to the ED due to confusion and somatic symptoms of anxiety. During this ED visit, pt provided a urine sample which showed signs of a UTI. Pt stated that she was surprised by this result, but also relieved as she feels this would explain some of her symptoms. In the last two weeks, she has been waking up in the middle of the night sweaty and with heart palpitations. Pt's OP psychiatrist made recent medication changes including  stopping Zoloft and starting Lexapro. Pt also stopped smoking marijuana two weeks ago. Pt stated that she has an established outpatient therapist that she enjoys meeting with. She lives with her daughter, who she finds to be very supportive. Discharge was recommended, which pt agreed to initially. After meeting with psychiatric provider, she requested to remain in observation status.    Goals for crisis stabilization:  UTI treatment, trial sleep medication    Next steps for Care Team:  LMHP reassessment, aftercare planning    Treatment Objectives Addressed:  rapport building, processing feelings, identifying additional supports, identifying treatment goals    Therapeutic Interventions:  Reviewed healthy living that supports positive mental health, including looking at sleep hygiene, regular movement, nutrition, and regular socialization., Provided positive reinforcement for progress towards goals, gains in knowledge, and application of skills previously taught.    Has a specific means been identified for suicidal/homicide actions: No      Patient coping skills attempted to reduce the crisis:  Medications    Disposition  Recommended referrals: Medication Management, Individual Therapy        Reviewed case and recommendations with attending provider. Attending Name: Butch Bhatt       Attending concurs with disposition: yes       Patient and/or validated legal guardian concurs with disposition: yes       Final disposition:  observation (Discharge was recommended, which pt agreed to initially. After meeting with psychiatric provider, she requested to remain in observation status.)           Legal status: Voluntary/Patient has signed consent for treatment                                                                              Reviewed court records: yes       Assessment Details   Total duration spent with the patient: 16 min     CPT code(s) utilized: 90440 - Psychotherapy (with patient) - 30 (16-37*)  CARLITOS Dutta, Psychotherapist  [facility] - Triage & Transition Services  Callback: [phone]

## 2025-03-31 NOTE — ED NOTES
Eveline is a 65 year old female with a history of one prior psychiatric hospitalization. Today she was received from ED due to anxiety. She would like to talk to [facility] staff about her recent medication changes that could be contributing to her anxiety. Patient said she quit medical pot two weeks ago as it caused more problems for her than relief. Denies SI/HI or any history of suicide attempts.    Observation sheet given to patient and discussed.    Nursing and risk assessments completed. Assessments reviewed with LMHP and physician. Admission information reviewed with patient. Patient given a tour of [facility] and instructions on using the facility. Questions regarding [facility] addressed. Pt safety search completed.

## 2025-03-31 NOTE — ED NOTES
Discharge instructions reviewed with patient including follow-up care plan. Educated on medication regime and advised not to stop prescribed medication without consulting their physician. Reviewed safety plan and outpatient resources/appointments.Verbalized understanding of discharge instructions. Denies SI. All belongings which where brought into the hospital have been returned to patient. Escorted off the unit @ 2630 with staff. Discharged to home with her daughter.

## 2025-03-31 NOTE — PLAN OF CARE
Eveline DARRON Britt Behboudi  March 30, 2025  Plan of Care Hand-off Note     Patient Recommended Care Path: observation (Discharge was recommended, which pt agreed to initially. After meeting with psychiatric provider, she requested to remain in observation status.)    Clinical Substantiation:  Patient presented to the ED due to confusion and somatic symptoms of anxiety. During this ED visit, pt provided a urine sample which showed signs of a UTI. Pt stated that she was surprised by this result, but also relieved as she feels this would explain some of her symptoms. In the last two weeks, she has been waking up in the middle of the night sweaty and with heart palpitations. Pt's OP psychiatrist made recent medication changes including stopping Zoloft and starting Lexapro. Pt also stopped smoking marijuana two weeks ago. Pt stated that she has an established outpatient therapist that she enjoys meeting with. She lives with her daughter, who she finds to be very supportive. Discharge was recommended, which pt agreed to initially. After meeting with psychiatric provider, she requested to remain in observation status.    Goals for crisis stabilization:  UTI treatment, trial sleep medication    Next steps for Care Team:  LMHP reassessment, aftercare planning    Treatment Objectives Addressed:  rapport building, processing feelings, identifying additional supports, identifying treatment goals    Therapeutic Interventions:  Reviewed healthy living that supports positive mental health, including looking at sleep hygiene, regular movement, nutrition, and regular socialization., Provided positive reinforcement for progress towards goals, gains in knowledge, and application of skills previously taught.    Has a specific means been identified for suicidal.homicide actions: No  If yes, describe:    Explain action steps toward mitigation:    Document completion of mitigation action:    The follow up action still needed prior to  discharge:      Patient coping skills attempted to reduce the crisis:  Medications            Collateral contact information:  Anila Purikofi (friend) [phone] and Sunny (Daughter) [phone]    Legal Status: Voluntary/Patient has signed consent for treatment                                                                             Reviewed court records: yes     Psychiatry Consult:     CARLITOS Lee

## 2025-03-31 NOTE — PSYCH
MD was paged with request for anti-diarrhreal medication. Staff report that patient has a hx of IBS and her home medication has not been working to relieve symptoms. This MD placed order for Imodium prn.

## 2025-03-31 NOTE — ED PROVIDER NOTES
[facility] Unit - Initial Psychiatric Observation Note  [facility] Emergency Department  Observation Initiation Date: Mar 30, 2025    Sherrie L Swanson Behboudi MRN: [id number]   Age: 65 year old YOB: 1960     History     Chief Complaint   Patient presents with    Palpitations     HPI  Sherrie L Swanson Behboudi is a 65 year old female with a past history notable for bipolar disorder, depression, anxiety, hyperlipidemia, vitamin D deficiency, and insomnia. Patient presented to the ED for evaluation of heightened anxiety and insomnia symptoms, along with other physical symptoms. Pt was seen in the [facility] ED yesterday with similar presentation. Patient was medically evaluated in the ED. Found to have a UTI. She was cleared and transferred to [facility] for further psychiatric assessment.           Past Medical History  Past Medical History:   Diagnosis Date    Bipolar I disorder (H)     Chronic nausea     Depression with anxiety     Hyperlipidemia     Medical cannabis use      Past Surgical History:   Procedure Laterality Date    LAPAROSCOPIC APPENDECTOMY N/A 10/25/2018    Procedure: LAPAROSCOPIC APPENDECTOMY;  Surgeon: Danilo Jennings MD;  Location:  OR     cephALEXin (KEFLEX) 500 MG capsule  escitalopram (LEXAPRO) 10 MG tablet  hydrOXYzine HCl (ATARAX) 25 MG tablet  acetaminophen (TYLENOL) 500 MG tablet  albuterol (PROAIR HFA/PROVENTIL HFA/VENTOLIN HFA) 108 (90 Base) MCG/ACT inhaler  aspirin 81 MG EC tablet  Calcium Carb-Cholecalciferol (CALCIUM 600 + D PO)  divalproex sodium extended-release (DEPAKOTE ER) 500 MG 24 hr tablet  folic acid (FOLVITE) 1 MG tablet  gabapentin (NEURONTIN) 400 MG capsule  HYDROmorphone (DILAUDID) 2 MG tablet  polyethylene glycol (MIRALAX) 17 g packet  senna-docusate (SENOKOT-S/PERICOLACE) 8.6-50 MG tablet  sertraline (ZOLOFT) 100 MG tablet  traZODone (DESYREL) 50 MG tablet  vitamin B-12 (CYANOCOBALAMIN) 1000 MCG tablet  vitamin D3 (CHOLECALCIFEROL) 1.25 MG (40054 UT)  "capsule  Vitamin D3 (CHOLECALCIFEROL) 25 mcg (1000 units) tablet      Allergies   Allergen Reactions    Dust Mites     Pcn [Penicillins] Unknown     Family History  History reviewed. No pertinent family history.  Social History   Social History     Tobacco Use    Smoking status: Never    Smokeless tobacco: Never   Substance Use Topics    Alcohol use: Yes    Drug use: Yes     Comment: Medical Cannibis      Past medical history, past surgical history, medications, allergies, family history, and social history were reviewed with the patient. No additional pertinent items.       Review of Systems  A medically appropriate review of systems was performed with pertinent positives and negatives noted in the HPI, and all other systems negative.    Physical Examination   BP: (!) 171/90  Pulse: 94  Temp: 98.8  F (37.1  C)  Resp: 22  Height: 170.2 cm (5' 7\")  Weight: 74.8 kg (164 lb 12.8 oz)  SpO2: 99 %    Physical Exam  General: Appears stated age.   Neuro: Alert and fully oriented. Extremities appear to demonstrate normal strength on visual inspection.   Integumentary/Skin: no rash visualized, normal color    Psychiatric Examination   Appearance: awake, alert, adequately groomed, appeared as age stated, and casually dressed  Attitude:  cooperative  Eye Contact:  fair  Mood:  anxious  Affect:  mood congruent and intensity is normal  Speech:  clear, coherent  Psychomotor Behavior:  no evidence of tardive dyskinesia, dystonia, or tics  Thought Process:  linear  Associations:  no loose associations  Thought Content:  no evidence of suicidal ideation or homicidal ideation and no evidence of psychotic thought  Insight:  fair  Judgement:  fair  Oriented to:  time, person, and place  Attention Span and Concentration:  fair  Recent and Remote Memory:  fair  Language: able to name/identify objects without impairment  Fund of Knowledge: intact with awareness of current and past events    ED Course     ED Course as of 03/30/25 2138   Sun " Mar 30, 2025   1809 I obtained history and examined the patient as noted above.     2001 I rechecked and updated the patient.         Labs Ordered and Resulted from Time of ED Arrival to Time of ED Departure   BASIC METABOLIC PANEL - Abnormal       Result Value    Sodium 134 (*)     Potassium 3.6      Chloride 97 (*)     Carbon Dioxide (CO2) 23      Anion Gap 14      Urea Nitrogen 10.7      Creatinine 0.56      GFR Estimate >90      Calcium 9.8      Glucose 113 (*)    TROPONIN T, HIGH SENSITIVITY - Abnormal    Troponin T, High Sensitivity 22 (*)    TROPONIN T, HIGH SENSITIVITY - Abnormal    Troponin T, High Sensitivity 18 (*)    ROUTINE UA WITH MICROSCOPIC REFLEX TO CULTURE - Abnormal    Color Urine Yellow      Appearance Urine Clear      Glucose Urine Negative      Bilirubin Urine Negative      Ketones Urine 60 (*)     Specific Gravity Urine 1.023      Blood Urine Negative      pH Urine 6.0      Protein Albumin Urine 20 (*)     Urobilinogen Urine Normal      Nitrite Urine Negative      Leukocyte Esterase Urine Moderate (*)     Mucus Urine Present (*)     RBC Urine 2      WBC Urine 26 (*)     Squamous Epithelials Urine 1     CBC WITH PLATELETS AND DIFFERENTIAL    WBC Count 6.8      RBC Count 4.78      Hemoglobin 14.2      Hematocrit 41.5      MCV 87      MCH 29.7      MCHC 34.2      RDW 13.5      Platelet Count 223      % Neutrophils 65      % Lymphocytes 23      % Monocytes 8      % Eosinophils 3      % Basophils 1      % Immature Granulocytes 0      NRBCs per 100 WBC 0      Absolute Neutrophils 4.4      Absolute Lymphocytes 1.6      Absolute Monocytes 0.6      Absolute Eosinophils 0.2      Absolute Basophils 0.0      Absolute Immature Granulocytes 0.0      Absolute NRBCs 0.0     URINE CULTURE       Assessments & Plan (with Medical Decision Making)   Patient presenting with heightened anxiety, insomnia, in addition to other symptoms including shakiness, nocturnal diaphoresis, confusion, and difficulty making  decisions. She also complains of frequent palpitations and nausea. Symptoms could be related to a number of recent factors, including a change from sertraline to escitalopram, abrupt cessation of cannabis, as well as contributions from a urinary tract infection. Pt reports her Depakote dose was decreased from 1000 mg to 500 mg about 3 months ago, and did not notice any mood changes following this. She questions the bipolar disorder diagnosis, denying hx of dax. Appears she was hospitalized in 2004 with concern for dax. Seeking an overnight stay to address her heightened anxiety and insomnia. Nursing notes reviewed noting no acute issues.     I have reviewed the assessment completed by the [facility].     During the observation period, the patient did not require medications for agitation, and did not require restraints/seclusion for patient and/or provider safety.     The patient was found to have a psychiatric condition that would benefit from an observation stay in the emergency department for further psychiatric stabilization and/or coordination of a safe disposition. The observation plan includes serial assessments of psychiatric condition, potential administration of medications if indicated, further disposition pending the patient's psychiatric course during the monitoring period.     Preliminary diagnosis:    ICD-10-CM    1. Pyuria  R82.81       2. Anxiety  F41.9       3. Mood disorder  F39     MDD vs bipolar disorder   4.      Insomnia        Treatment Plan:  - Reduce dose of escitalopram from 10 mg to 5 mg daily. Pt with recent nocturnal diaphoresis and shakiness, which can be a potential side effect of SSRIs. Pt recently switched from sertraline to escitalopram about 12 days ago.   - Trial mirtazapine 7.5 mg nightly for sleep and anxiety augmentation. Consider further titration.   - Continue Depakote  mg nightly for mood stabilization. Patient questions her bipolar disorder diagnosis.   - Of note,  patient stopped using cannabis about two weeks ago, which may also be contributing to recent symptoms involving insomnia and elevated anxiety.  - UA collected in ED consistent with UTI. Culture pending. Started on Keflex 500 mg bid for 5 days. UTI may be contributing to recent confusion and elevated anxiety.   - Vitamin D level from 11/2024 was low at 11. Pt recently just began supplementing.   - EKG reviewed - Qtc 432. Troponins 22 on arrival, trending down to 18. BMP reviewed - sodium 134, chloride 97. CBC wnl.   - Pt will be registered to observation status, plan for reassessment tomorrow.     --  Butch Bhatt CNP   [facility] EMERGENCY DEPT  EmPATH Unit

## 2025-03-31 NOTE — ED PROVIDER NOTES
EmPATH Unit - Psychiatric Observation Discharge Summary  [facility] Emergency Department  Discharge Date: 3/31/2025    Sherrie L Swanson Behboudi MRN: [id number]   Age: 65 year old YOB: 1960     Brief HPI & Initial ED Course     Chief Complaint   Patient presents with    Palpitations     HPI  Sherrie L Swanson Behboudi is a 65 year old female with history notable for a history of bipolar disorder with some discussion regarding diagnostic accuracy and the possibility of an underlying depressive disorder.  She is currently under observation status on the EmPATH unit, now approaching 19 hours in the emergency department.  Overnight, there were no acute issues.  Documentation indicates that her treatment plan has focused on reducing the dose of Lexapro to minimize presumed side effects while initiating a different antidepressant medication, Remeron.  She is being reevaluated today.    On approach, the patient was sitting comfortably in her recliner and watching television.  She accompanied me to the interview room for our meeting.  She reports that she slept very well last night and attributes this improvement to the addition of Remeron.  She is tolerating the medication without side effects.  Her mood is better today with less anxiety.  She denied active depressive symptoms.  Energy is improving.  Appetite is normal.  Concentration is improving.  She reflected on discovery of a recent UTI, further reflecting on her mother having experienced a UTI around the same age and had experienced significant cognitive decline as a result.  Her cognitive symptoms significantly improved on antibiotic therapies.  She has happy that the UTI was discovered early and that she is taking antibiotic medications.  She denied suicidal and homicidal thoughts.  There was no indication of psychosis.  She interprets readiness to discharge home today.  She plans to contact her daughter to arrange transportation back  "home.        Physical Examination   BP: (!) 156/82  Pulse: 86  Temp: 98.3  F (36.8  C)  Resp: 16  Height: 170.2 cm (5' 7\")  Weight: 74.8 kg (164 lb 12.8 oz)  SpO2: 98 %    Physical Exam  General: Appears stated age.   Neuro: Alert and fully oriented. Extremities appear to demonstrate normal strength on visual inspection.   Integumentary/Skin: no rash visualized, normal color    Psychiatric Examination   Appearance: awake, alert  Attitude:  cooperative  Eye Contact:  fair  Mood:  better  Affect:  appropriate and in normal range  Speech:  clear, coherent  Psychomotor Behavior:  no evidence of tardive dyskinesia, dystonia, or tics  Thought Process:  logical and linear  Associations:  no loose associations  Thought Content:  no evidence of suicidal ideation or homicidal ideation and no evidence of psychotic thought  Insight:  fair  Judgement:  fair  Oriented to:  time, person, and place  Attention Span and Concentration:  fair  Recent and Remote Memory:  fair  Language: able to name/identify objects without impairment  Fund of Knowledge: intact with awareness of current and past events    Results     ED Course as of 03/31/25 1111   Sun Mar 30, 2025   1809 I obtained history and examined the patient as noted above.     2001 I rechecked and updated the patient.         Labs Ordered and Resulted from Time of ED Arrival to Time of ED Departure   BASIC METABOLIC PANEL - Abnormal       Result Value    Sodium 134 (*)     Potassium 3.6      Chloride 97 (*)     Carbon Dioxide (CO2) 23      Anion Gap 14      Urea Nitrogen 10.7      Creatinine 0.56      GFR Estimate >90      Calcium 9.8      Glucose 113 (*)    TROPONIN T, HIGH SENSITIVITY - Abnormal    Troponin T, High Sensitivity 22 (*)    TROPONIN T, HIGH SENSITIVITY - Abnormal    Troponin T, High Sensitivity 18 (*)    ROUTINE UA WITH MICROSCOPIC REFLEX TO CULTURE - Abnormal    Color Urine Yellow      Appearance Urine Clear      Glucose Urine Negative      Bilirubin Urine " Negative      Ketones Urine 60 (*)     Specific Gravity Urine 1.023      Blood Urine Negative      pH Urine 6.0      Protein Albumin Urine 20 (*)     Urobilinogen Urine Normal      Nitrite Urine Negative      Leukocyte Esterase Urine Moderate (*)     Mucus Urine Present (*)     RBC Urine 2      WBC Urine 26 (*)     Squamous Epithelials Urine 1     CBC WITH PLATELETS AND DIFFERENTIAL    WBC Count 6.8      RBC Count 4.78      Hemoglobin 14.2      Hematocrit 41.5      MCV 87      MCH 29.7      MCHC 34.2      RDW 13.5      Platelet Count 223      % Neutrophils 65      % Lymphocytes 23      % Monocytes 8      % Eosinophils 3      % Basophils 1      % Immature Granulocytes 0      NRBCs per 100 WBC 0      Absolute Neutrophils 4.4      Absolute Lymphocytes 1.6      Absolute Monocytes 0.6      Absolute Eosinophils 0.2      Absolute Basophils 0.0      Absolute Immature Granulocytes 0.0      Absolute NRBCs 0.0     URINE CULTURE       Observation Course   The patient was found to have a psychiatric condition that would benefit from an observation stay in the emergency department for further psychiatric stabilization and/or coordination of a safe disposition. The plan upon observation admission included serial assessments of psychiatric condition, potential administration of medications if indicated, further disposition pending the patient's psychiatric course during the monitoring period.     Serial assessments of the patient's psychiatric condition were performed. Nursing notes were reviewed. During the observation period, the patient did not require medications for agitation, and did not require restraints/seclusion for patient and/or provider safety.     After a period of working with the treatment team on the EmPATH unit, the patient's mental state improved to allow a safe transition to outpatient care. After counseling on the diagnosis, work-up, and treatment plan, the patient was discharged. Close follow-up with a  psychiatrist and/or therapist was recommended and community psychiatric resources were provided. Patient is to return to the ED if any urgent or potentially life-threatening concerns.     Discharge Diagnoses:   Final diagnoses:   Pyuria   Anxiety   Mood disorder - MDD vs bipolar disorder       Treatment Plan:  -Continue Keflex 500 mg twice a day for a total of 5 days for treatment of UTI.  Urine cultures remain pending however urinalysis was highly suspicious for a UTI.  -Continue the lower dose of Lexapro 5 mg daily; noting that the dose was recently reduced to address nocturnal diaphoresis.  This concern appears to have resolved.  Continue to monitor on an outpatient basis.  -Continue mirtazapine 7.5 mg nightly for antidepressant treatment with additional benefits at minimizing insomnia.  -Continue Depakote  mg nightly for mood stabilization.  -Longer term medication plan: Consider pursuing monotherapy with mirtazapine and tapering off of Lexapro due to poor tolerability at the higher dose.  The patient may also be a poor metabolizer of Lexapro and could benefit from pharmacogenomics testing to better explore metabolism abnormalities that may have complicated her response.  -Resume outpatient medication management appointments and psychotherapy  -Discharge home today.      At the time of discharge, the patient's acute suicide risk was determined to be low due to the following factors: Reduction in the intensity of mood/anxiety symptoms that preceded the admission, denial of suicidal thoughts, denies feeling helpless or hopeless, not currently under the influence of alcohol or illicit substances, denies experiencing command hallucinations, no immediate access to firearms. The patient's acute risk could be higher if noncompliant with their treatment plan, medications, follow-up appointments or using illicit substances or alcohol. Protective factors include: social supports, stable housing    I spent more than  30 minutes on discharge day activities.    --  Terrence Lopez MD  [facility] EMERGENCY DEPT  EmPATH Unit       Terrence Lopez MD  03/31/25 [id number]

## 2025-04-01 ENCOUNTER — TELEPHONE (OUTPATIENT)
Dept: BEHAVIORAL HEALTH | Facility: CLINIC | Age: 65
End: 2025-04-01

## 2025-04-01 LAB — BACTERIA UR CULT: NORMAL

## 2025-04-01 NOTE — TELEPHONE ENCOUNTER
Spoke with pt who reported not needing further support RE: DEC follow up. Writer informed pt they can call back in the future if needed.?

## 2025-04-08 ENCOUNTER — HOSPITAL ENCOUNTER (OUTPATIENT)
Facility: CLINIC | Age: 65
Setting detail: OBSERVATION
End: 2025-04-08
Attending: EMERGENCY MEDICINE

## 2025-04-08 DIAGNOSIS — F41.8 DEPRESSION WITH ANXIETY: ICD-10-CM

## 2025-04-08 DIAGNOSIS — F41.9 ANXIETY: Primary | ICD-10-CM

## 2025-04-08 DIAGNOSIS — F41.1 GAD (GENERALIZED ANXIETY DISORDER): ICD-10-CM

## 2025-04-08 DIAGNOSIS — R45.851 PASSIVE SUICIDAL IDEATIONS: ICD-10-CM

## 2025-04-08 DIAGNOSIS — F39 MOOD DISORDER: ICD-10-CM

## 2025-04-08 LAB
ALBUMIN UR-MCNC: NEGATIVE MG/DL
APPEARANCE UR: CLEAR
BILIRUB UR QL STRIP: NEGATIVE
COLOR UR AUTO: ABNORMAL
GLUCOSE UR STRIP-MCNC: NEGATIVE MG/DL
HGB UR QL STRIP: NEGATIVE
KETONES UR STRIP-MCNC: 10 MG/DL
LEUKOCYTE ESTERASE UR QL STRIP: ABNORMAL
MUCOUS THREADS #/AREA URNS LPF: PRESENT /LPF
NITRATE UR QL: NEGATIVE
PH UR STRIP: 6 [PH] (ref 5–7)
RBC URINE: 1 /HPF
SP GR UR STRIP: 1.02 (ref 1–1.03)
SQUAMOUS EPITHELIAL: <1 /HPF
UROBILINOGEN UR STRIP-MCNC: NORMAL MG/DL
WBC URINE: 4 /HPF

## 2025-04-08 PROCEDURE — 81001 URINALYSIS AUTO W/SCOPE: CPT | Performed by: EMERGENCY MEDICINE

## 2025-04-08 PROCEDURE — 99285 EMERGENCY DEPT VISIT HI MDM: CPT

## 2025-04-08 RX ORDER — ACETAMINOPHEN 325 MG/1
650 TABLET ORAL EVERY 4 HOURS PRN
Status: DISCONTINUED | OUTPATIENT
Start: 2025-04-08 | End: 2025-04-11 | Stop reason: HOSPADM

## 2025-04-08 RX ORDER — OLANZAPINE 10 MG/1
10 TABLET, ORALLY DISINTEGRATING ORAL 2 TIMES DAILY PRN
Status: DISCONTINUED | OUTPATIENT
Start: 2025-04-08 | End: 2025-04-09

## 2025-04-08 RX ORDER — HYDROXYZINE HYDROCHLORIDE 25 MG/1
25 TABLET, FILM COATED ORAL EVERY 6 HOURS PRN
Status: DISCONTINUED | OUTPATIENT
Start: 2025-04-08 | End: 2025-04-11 | Stop reason: HOSPADM

## 2025-04-08 RX ORDER — MIRTAZAPINE 7.5 MG/1
7.5 TABLET, FILM COATED ORAL AT BEDTIME
Status: DISCONTINUED | OUTPATIENT
Start: 2025-04-08 | End: 2025-04-09

## 2025-04-08 RX ORDER — ONDANSETRON 4 MG/1
4 TABLET, ORALLY DISINTEGRATING ORAL EVERY 6 HOURS PRN
Status: DISCONTINUED | OUTPATIENT
Start: 2025-04-08 | End: 2025-04-11 | Stop reason: HOSPADM

## 2025-04-08 RX ORDER — ESCITALOPRAM OXALATE 5 MG/1
5 TABLET ORAL DAILY
Status: DISCONTINUED | OUTPATIENT
Start: 2025-04-09 | End: 2025-04-10

## 2025-04-08 RX ORDER — DICYCLOMINE HYDROCHLORIDE 10 MG/1
10 CAPSULE ORAL 3 TIMES DAILY PRN
Status: DISCONTINUED | OUTPATIENT
Start: 2025-04-08 | End: 2025-04-11 | Stop reason: HOSPADM

## 2025-04-08 RX ORDER — DICYCLOMINE HYDROCHLORIDE 10 MG/1
10 CAPSULE ORAL 3 TIMES DAILY PRN
COMMUNITY
Start: 2025-01-03

## 2025-04-08 RX ORDER — IBUPROFEN 600 MG/1
600 TABLET, FILM COATED ORAL EVERY 6 HOURS PRN
Status: DISCONTINUED | OUTPATIENT
Start: 2025-04-08 | End: 2025-04-11 | Stop reason: HOSPADM

## 2025-04-08 RX ORDER — TRAZODONE HYDROCHLORIDE 50 MG/1
50 TABLET ORAL
Status: DISCONTINUED | OUTPATIENT
Start: 2025-04-08 | End: 2025-04-11 | Stop reason: HOSPADM

## 2025-04-08 RX ORDER — DIVALPROEX SODIUM 500 MG/1
500 TABLET, FILM COATED, EXTENDED RELEASE ORAL AT BEDTIME
Status: DISCONTINUED | OUTPATIENT
Start: 2025-04-08 | End: 2025-04-11 | Stop reason: HOSPADM

## 2025-04-08 RX ORDER — OLANZAPINE 10 MG/2ML
10 INJECTION, POWDER, FOR SOLUTION INTRAMUSCULAR 2 TIMES DAILY PRN
Status: DISCONTINUED | OUTPATIENT
Start: 2025-04-08 | End: 2025-04-09

## 2025-04-08 RX ORDER — VITAMIN B COMPLEX
25 TABLET ORAL DAILY
Status: DISCONTINUED | OUTPATIENT
Start: 2025-04-09 | End: 2025-04-11 | Stop reason: HOSPADM

## 2025-04-08 ASSESSMENT — ACTIVITIES OF DAILY LIVING (ADL)
ADLS_ACUITY_SCORE: 42

## 2025-04-08 ASSESSMENT — COLUMBIA-SUICIDE SEVERITY RATING SCALE - C-SSRS
5. HAVE YOU STARTED TO WORK OUT OR WORKED OUT THE DETAILS OF HOW TO KILL YOURSELF? DO YOU INTEND TO CARRY OUT THIS PLAN?: NO
6. HAVE YOU EVER DONE ANYTHING, STARTED TO DO ANYTHING, OR PREPARED TO DO ANYTHING TO END YOUR LIFE?: NO
2. HAVE YOU ACTUALLY HAD ANY THOUGHTS OF KILLING YOURSELF IN THE PAST MONTH?: YES
3. HAVE YOU BEEN THINKING ABOUT HOW YOU MIGHT KILL YOURSELF?: NO
4. HAVE YOU HAD THESE THOUGHTS AND HAD SOME INTENTION OF ACTING ON THEM?: YES
1. IN THE PAST MONTH, HAVE YOU WISHED YOU WERE DEAD OR WISHED YOU COULD GO TO SLEEP AND NOT WAKE UP?: YES

## 2025-04-08 NOTE — ED TRIAGE NOTES
Pt presents to triage with daughter; pt C/O anxiety, depression passive SI. Pt does not have a plan for self harm at this time. Pt contracts for safety while in ED.      Triage Assessment (Adult)       Row Name 04/08/25 1520          Triage Assessment    Airway WDL WDL        Respiratory WDL    Respiratory WDL WDL        Skin Circulation/Temperature WDL    Skin Circulation/Temperature WDL WDL        Cardiac WDL    Cardiac WDL WDL        Peripheral/Neurovascular WDL    Peripheral Neurovascular WDL WDL        Cognitive/Neuro/Behavioral WDL    Cognitive/Neuro/Behavioral WDL WDL

## 2025-04-08 NOTE — ED PROVIDER NOTES
Emergency Department Note      History of Present Illness     Chief Complaint   Mental Health Problem      HPI   Sherrie L Swanson Behboudi is a 65 year old female presents to the emergency department with a history of bipolar 1 disorder, also with a history of depression, who was just seen in the empath unit approximately 1 week ago.  The patient notes that she had medication adjustment and her mood has been lower since that happened, she has been increasingly anxious, and she has been having worsening passive suicidal ideation with no formulated plan.  She came back in hoping to see the empath team again for further guidance and management.  She is here with a supportive friend/family member again.  Patient does have a history of hospitalization in the remote past after a medication change and worsening symptoms.    Independent Historian   None    Review of External Notes   Reviewed the note from the psychiatric nurse practitioner from March 30.  The patient was noted to not require any medications for agitation and did not require any restraints or seclusion.  The patient was placed in observation status in the psychiatric stabilization area.  Her discharge diagnoses preliminarily were anxiety and mood disorder with major depressive disorder versus bipolar disorder.  Patient also was noted to have insomnia.  Patient was also seen in consultation with a psychiatrist, and it was recommended that she continue the Keflex for treatment of UTI.  It was recommended that she continue a lower dose of Lexapro at 5 mg daily.  Mirtazapine 7.5 mg nightly was to be continued.  Depakote  mg nightly for mood stabilization was to be continued.  Additional longer-term medication plans were common in on.  At the time of discharge the patient's acute suicide risk was determined to be low.    Past Medical History     Medical History and Problem List   Past Medical History:   Diagnosis Date    Bipolar I disorder (H)      Chronic nausea     Depression with anxiety     Hyperlipidemia     Medical cannabis use        Medications   acetaminophen (TYLENOL) 500 MG tablet  albuterol (PROAIR HFA/PROVENTIL HFA/VENTOLIN HFA) 108 (90 Base) MCG/ACT inhaler  aspirin 81 MG EC tablet  Calcium Carb-Cholecalciferol (CALCIUM 600 + D PO)  divalproex sodium extended-release (DEPAKOTE ER) 500 MG 24 hr tablet  escitalopram (LEXAPRO) 5 MG tablet  folic acid (FOLVITE) 1 MG tablet  gabapentin (NEURONTIN) 400 MG capsule  HYDROmorphone (DILAUDID) 2 MG tablet  hydrOXYzine HCl (ATARAX) 25 MG tablet  mirtazapine (REMERON) 7.5 MG tablet  polyethylene glycol (MIRALAX) 17 g packet  senna-docusate (SENOKOT-S/PERICOLACE) 8.6-50 MG tablet  traZODone (DESYREL) 50 MG tablet  vitamin B-12 (CYANOCOBALAMIN) 1000 MCG tablet  vitamin D3 (CHOLECALCIFEROL) 1.25 MG (60712 UT) capsule  Vitamin D3 (CHOLECALCIFEROL) 25 mcg (1000 units) tablet        Surgical History   Past Surgical History:   Procedure Laterality Date    LAPAROSCOPIC APPENDECTOMY N/A 10/25/2018    Procedure: LAPAROSCOPIC APPENDECTOMY;  Surgeon: Danilo Jennings MD;  Location:  OR       Physical Exam     Patient Vitals for the past 24 hrs:   BP Temp Temp src Pulse Resp SpO2   04/08/25 1518 (!) 177/72 97.6  F (36.4  C) Temporal 95 18 99 %     Physical Exam  General: Resting comfortably  Head:  The scalp, face, and head appear normal  Eyes:  The pupils are equal, round, and reactive to light    There is no nystagmus    Conjunctivae and sclerae are normal  ENT:    The nose is normal    Pinnae are normal  Neck:  Normal range of motion  CV:  Regular rate  Normal underlying rhythm     No pathological murmur detected  Resp:  Lungs are clear  Skin:  No rash or acute skin lesions noted  Neuro:  Speech is normal and fluent    No motor deficits  Psych: Alert.    Flat affect    Passive suicidal ideation    No homicidal ideation    No visual hallucinations    No auditory hallucinations    No delusions    No manic  features            Diagnostics     Lab Results   Labs Ordered and Resulted from Time of ED Arrival to Time of ED Departure - No data to display    Imaging   No orders to display       ED Course      Medications Administered   Medications - No data to display    Procedures   Procedures     Discussion of Management   None    ED Course        Additional Documentation  None    Medical Decision Making / Diagnosis     CMS Diagnoses: None    MIPS       None    MDM   Sherrie L Swanson Behboudi is a 65 year old female presents to the emergency department with worsening depression, anxiety, and passive suicidal ideation.  She was just seen in the [facility] area approximately 1 week ago and feels that symptoms are intensifying.  The patient is clinically clear and stable and [facility]    9:07 PM  The patient was seen briefly in the waiting room by the mental health therapist.  There are major backlog's in the empath unit and numerous patients with mental health concerns or waiting in the waiting room at this time.  This patient reportedly told the therapist that she is not willing to wait several additional hours for a bed/space/resource in [facility] and would like to go home.  The patient does have decisional capacity and her suicidal ideation is fairly vague and passive, admittedly, based on her account.  I was very hopeful that she would stay for a complete evaluation and management that but that does not appear to be something that she is interested in doing at this time.    Disposition   The patient was transferred to [facility].     Diagnosis     ICD-10-CM    1. Depression with anxiety  F41.8       2. Passive suicidal ideations  R45.851            Discharge Medications   New Prescriptions    No medications on file         MD Bogdan Hendricks Michael P, MD  04/08/25 6686       Danilo Mcfadden MD  04/08/25 [id number]

## 2025-04-08 NOTE — ED NOTES
[facility]  ED to EMPATH Checklist:      Goal for EMPATH: Depression management, Anxiety management, and Suicidality    Current Behavior: Calm and Cooperative    Safety Concerns: Suicidal, no plan    Legal Hold Status: Voluntary    Medically Cleared by ED provider: Yes    Patient Therapeutically Searched: Not searched - Currently in triage    Belongings: Remain with patient    Independent Ambulation at Baseline: Yes/No: Yes    Participates in Care/Conversation: Yes/No: Yes    Patient Informed about EMPATH: Yes/No: Yes    DEC: Ordered and pending    Patient Ready to be Transferred to EMPATH? Yes/No: Yes

## 2025-04-09 PROCEDURE — 250N000013 HC RX MED GY IP 250 OP 250 PS 637: Performed by: NURSE PRACTITIONER

## 2025-04-09 PROCEDURE — 99223 1ST HOSP IP/OBS HIGH 75: CPT | Mod: AI | Performed by: NURSE PRACTITIONER

## 2025-04-09 PROCEDURE — 250N000013 HC RX MED GY IP 250 OP 250 PS 637: Performed by: PSYCHIATRY & NEUROLOGY

## 2025-04-09 PROCEDURE — G0378 HOSPITAL OBSERVATION PER HR: HCPCS

## 2025-04-09 RX ORDER — MIRTAZAPINE 15 MG/1
15 TABLET, FILM COATED ORAL AT BEDTIME
Status: DISCONTINUED | OUTPATIENT
Start: 2025-04-09 | End: 2025-04-11 | Stop reason: HOSPADM

## 2025-04-09 RX ORDER — OLANZAPINE 10 MG/2ML
5 INJECTION, POWDER, FOR SOLUTION INTRAMUSCULAR 2 TIMES DAILY PRN
Status: DISCONTINUED | OUTPATIENT
Start: 2025-04-09 | End: 2025-04-11 | Stop reason: HOSPADM

## 2025-04-09 RX ORDER — CLONIDINE HYDROCHLORIDE 0.1 MG/1
0.1 TABLET ORAL 3 TIMES DAILY PRN
Status: DISCONTINUED | OUTPATIENT
Start: 2025-04-09 | End: 2025-04-11 | Stop reason: HOSPADM

## 2025-04-09 RX ORDER — OLANZAPINE 5 MG/1
5 TABLET, ORALLY DISINTEGRATING ORAL 2 TIMES DAILY PRN
Status: DISCONTINUED | OUTPATIENT
Start: 2025-04-09 | End: 2025-04-11 | Stop reason: HOSPADM

## 2025-04-09 RX ADMIN — METHYLCELLULOSE 1000 MG: 500 TABLET ORAL at 07:48

## 2025-04-09 RX ADMIN — DIVALPROEX SODIUM 500 MG: 500 TABLET, FILM COATED, EXTENDED RELEASE ORAL at 00:10

## 2025-04-09 RX ADMIN — Medication 600 MG: at 07:48

## 2025-04-09 RX ADMIN — MIRTAZAPINE 15 MG: 15 TABLET, FILM COATED ORAL at 21:18

## 2025-04-09 RX ADMIN — MIRTAZAPINE 7.5 MG: 7.5 TABLET, FILM COATED ORAL at 00:10

## 2025-04-09 RX ADMIN — ESCITALOPRAM OXALATE 5 MG: 5 TABLET, FILM COATED ORAL at 07:48

## 2025-04-09 RX ADMIN — DIVALPROEX SODIUM 500 MG: 500 TABLET, FILM COATED, EXTENDED RELEASE ORAL at 21:18

## 2025-04-09 RX ADMIN — CLONIDINE HYDROCHLORIDE 0.1 MG: 0.1 TABLET ORAL at 08:38

## 2025-04-09 RX ADMIN — HYDROXYZINE HYDROCHLORIDE 25 MG: 25 TABLET ORAL at 07:48

## 2025-04-09 RX ADMIN — Medication 25 MCG: at 07:48

## 2025-04-09 RX ADMIN — Medication 600 MG: at 18:12

## 2025-04-09 RX ADMIN — HYDROXYZINE HYDROCHLORIDE 25 MG: 25 TABLET ORAL at 18:15

## 2025-04-09 RX ADMIN — METHYLCELLULOSE 1000 MG: 500 TABLET ORAL at 21:18

## 2025-04-09 ASSESSMENT — ACTIVITIES OF DAILY LIVING (ADL)
ADLS_ACUITY_SCORE: 42

## 2025-04-09 NOTE — CONSULTS
"Diagnostic Evaluation Consultation  Crisis Assessment    Patient Name: Sherrie L Swanson Behboudi  Age:  65 year old  Legal Sex: female  Gender Identity: female  Pronouns:   Race: White  Ethnicity: Not  or   Language: English      Patient was assessed: In person   Crisis Assessment Start Date: 04/08/25  Crisis Assessment Start Time: 1910  Crisis Assessment Stop Time: 2010  Patient location: [facility] Emergency Dept                             [facility]    Referral Data and Chief Complaint  Sherrie L Swanson Behboudi presents to the ED with family/friends. Patient is presenting to the ED for the following concerns: Anxiety, Depression. Factors that make the mental health crisis life threatening or complex are: Patient presents to the Emergency Department with her two adult daughters due to worsening anxiety and feeling like she cannot care for herself on her own. \"I'm not feeling well, I'm not feeling like myself.:  Patient was seen in the Emergency Department and [facility] one week ago medications were slightly adjusted. Patient reports that since discharge, she has not been doing well. She reports poor sleep, poor appetite, fear, worry and feeling unable to care for herself at home.   Patient reports being able to tend to her ADLS however is fearful that something bad could happen.   Patient is able to clarify that she has been feeling hopeless and worried, but not suicidal. She denies having thoughts of killing herself nor wanting to die, patient denies having a plan for how she would harm or kill herself. She denies any history of suicidal ideation nor suicide attempts..      Informed Consent and Assessment Methods  Explained the crisis assessment process, including applicable information disclosures and limits to confidentiality, assessed understanding of the process, and obtained consent to proceed with the assessment.  Assessment methods included conducting a formal interview with " patient, review of medical records, collaboration with medical staff, and obtaining relevant collateral information from family and community providers when available.  : done     History of the Crisis   Sherrie L Swanson Behboudi is a 65 year old female who presents to the Emergency Department due to worsening anxiety and feeling unable to care for herself at home. Per chart review, collateral information and patient report, they have a history of anxiety and depression. Patient was brought in by her adult daughter from her home in the community.   Patient presents flat, anxious, yet cooperative.   Patient has had similar episodes as today's presentation; she was seen in the Emergency Department and [facility] one week ago.   Patient has no history of previous inpatient psychiatric hospitalization.   Patient lives with her daughter and her dog.   She reports struggling to take the dog out as needed lately.   Patient has no history of suicide attempt nor self injurious behavior   Patient does have an outpatient psychiatry provider, however the provider is out of office until tomorrow. Patient does not feel like she can wait to be seen by her outpatient provider and is requesting to remain on observation.    Brief Psychosocial History  Family:  , Children yes  Support System:  Children, Friend  Employment Status:  retired  Source of Income:  social security, pension/detention  Financial Environmental Concerns:  none  Current Hobbies:     Barriers in Personal Life:       Significant Clinical History  Current Anxiety Symptoms:  anxious, excessive worry  Current Depression/Trauma:  hopelessness, impaired decision making  Current Somatic Symptoms:  excessive worry, anxious  Current Psychosis/Thought Disturbance:     Current Eating Symptoms:  loss of appetite  Chemical Use History:  Alcohol: None  Benzodiazepines: None  Opiates: None  Cocaine: None  Marijuana: None  Other Use: None   Past diagnosis:  Anxiety Disorder,  Depression  Family history:     Past treatment:  Individual therapy, Psychiatric Medication Management  Details of most recent treatment:  Dr. Yue Jacques for psychiatric medication management and Laxmi Hsu PsyD, LP at [facility] for therapy  Other relevant history:       Have there been any medication changes in the past two weeks:  yes, please comment  OP psychiatrist made recent medication changes including stopping Zoloft and starting Lexapro    Is the patient compliant with medications:  yes        Collateral Information  Is there collateral information: Yes     Collateral information name, relationship, phone number:  Ishan Behboudi (Daughter)  [phone]    What happened today: Patient has been struggling to be on hr own lately. She has been asking for one our her daughters to come with her to the bathroom.   Patient was seen in the Emergency Department and [facility] one week ago. She has not improved since she was here.     What is different about patient's functioning: Patient reports being afraid of several things. She is not sleeping well. The patient lacks an appetite and her daughters have to remind her to eat.     What do you think the patient needs:      Has patient made comments about wanting to kill themselves/others: no    If d/c is recommended, can they take part in safety/aftercare planning:  yes    Additional collateral information:        Risk Assessment  [place] Suicide Severity Rating Scale Full Clinical Version:  Suicidal Ideation  Q1 Wish to be Dead (Lifetime): No  Q2 Non-Specific Active Suicidal Thoughts (Lifetime): No  Q6 Suicide Behavior (Lifetime): no  Intensity of Ideation (Lifetime)  Deterrents (Lifetime): Does not apply  Reasons for Ideation (Lifetime): Does not apply  Suicidal Behavior (Lifetime)  Actual Attempt (Lifetime): No  Has subject engaged in non-suicidal self-injurious behavior? (Lifetime): No  Interrupted Attempts (Lifetime): No  Aborted or Self-Interrupted  Attempt (Lifetime): No  Preparatory Acts or Behavior (Lifetime): No    [place] Suicide Severity Rating Scale Recent:   Suicidal Ideation (Recent)  Q1 Wished to be Dead (Past Month): no  Q2 Suicidal Thoughts (Past Month): no  Q3 Suicidal Thought Method: no  Q4 Suicidal Intent without Specific Plan: no  Q5 Suicide Intent with Specific Plan: no  Level of Risk per Screen: no risks indicated  Intensity of Ideation (Recent)  Deterrents (Past 1 Month): Does not apply  Reasons for Ideation (Past 1 Month): Does not apply  Suicidal Behavior (Recent)  Actual Attempt (Past 3 Months): No  Has subject engaged in non-suicidal self-injurious behavior? (Past 3 Months): No  Interrupted Attempts (Past 3 Months): No  Aborted or Self-Interrupted Attempt (Past 3 Months): No  Preparatory Acts or Behavior (Past 3 Months): No    Environmental or Psychosocial Events: helplessness/hopelessness  Protective Factors: Protective Factors: strong bond to family unit, community support, or employment, responsibilities and duties to others, including pets and children, lives in a responsibly safe and stable environment, good treatment engagement, able to access care without barriers, help seeking    Does the patient have thoughts of harming others? Feels Like Hurting Others: no  Previous Attempt to Hurt Others: no  Is the patient engaging in sexually inappropriate behavior?: no  Does Patient have a known history of aggressive behavior: No  Has aggression occurred as a result of MH concerns/diagnosis: no  Does patient have history of aggression in hospital: no    Is the patient engaging in sexually inappropriate behavior?  no        Mental Status Exam   Affect: Blunted  Appearance: Appropriate  Attention Span/Concentration: Attentive  Eye Contact: Engaged    Fund of Knowledge: Appropriate   Language /Speech Content: Fluent  Language /Speech Volume: Soft  Language /Speech Rate/Productions: Slow, Minimally Responsive  Recent Memory: Intact  Remote  Memory: Intact  Mood: Anxious  Orientation to Person: Yes   Orientation to Place: Yes  Orientation to Time of Day: Yes  Orientation to Date: Yes     Situation (Do they understand why they are here?): Yes  Psychomotor Behavior: Normal  Thought Content: Clear  Thought Form: Intact       Medication  Psychotropic medications:   Medication Orders - Psychiatric (From admission, onward)      None             Current Care Team  Patient Care Team:  Crystal Kirby PA as PCP - General    Diagnosis  Patient Active Problem List   Diagnosis Code    Acute appendicitis with localized peritonitis K35.30    Closed fracture of right hip with routine healing S72.001D    Pain R52    Anticoagulated Z79.01    Bipolar disorder, current episode mixed, moderate (H) F31.62    ABLA (acute blood loss anemia) D62    Primary insomnia F51.01    Urinary retention R33.9    Drug-induced constipation K59.03    Mood disorder F39    Anxiety F41.9    Pyuria R82.81       Primary Problem This Admission  Anxiety F41.9      Clinical Summary and Substantiation of Recommendations   Clinical Substantiation:  A lower level of care has been unsuccessful in treating and stabilizing patient's mental health symptoms of anxiety, worry, and feeling unable to care for herself. Although the patient does not meet the criteria for full inpatient psychiatric hospitalization, they have been found to have a psychiatric symptoms of anxiety, alogia, and fears of being alone;  that could be benefited from an observation stay in the emergency department for further psychiatric stabilization and/or coordination of a safe disposition.   Patient will remain in the Emergency Department under observation for continued monitoring, and therapeutic interventions for mental health symptoms.The observation plan includes serial assessments of psychiatric condition, potential administration of medications if indicated, further disposition pending the patient's psychiatric course during  the monitoring period. Patient is not able to safety plan at the time of the initial assessment and would benefit from a period of stabilization to again attempt to safety plan for discharge.   The patient reports that she as an outpatient psychiatrist who recently made a medication adjustment. Eveline reports the following protective factors; her strong relationship with her daughters.   Eveline is open to meeting with the psychiatry provider to explore possible medicaitons for symptoms stabilization.    Goals for crisis stabilization:  Increase the pts ability to discharge safely to home. Establish follow up apt for current psych provider.    Next steps for Care Team:  Pt to be trasnferred to the [facility] unit once a bed is made available.    Treatment Objectives Addressed:  processing feelings, assessing safety    Therapeutic Interventions:  Explored strategies for self-soothing., Explored and identified early warning signs to to feeling unsafe.    Has a specific means been identified for suicidal/homicide actions: No    Patient coping skills attempted to reduce the crisis:  Prior to arrival, patient attempted to utilize taking her medications and seeking support from her daughters.  Once in the Emergency Department, this writer attempted to engage with Eveline by offering therapeutic listening and exploration into the presenting and precipitating events. This writer attempted to build rapport in a non confrontational manner, providing the patient to express feelings and thoughts as fully as possible. Patient was provided with a quiet area and time for them to decompress and calm their emotional expression.   Eveline was receptive to these interventions.    Disposition  Recommended referrals: Individual Therapy, Medication Management        Reviewed case and recommendations with attending provider. Attending Name: Emergency Department provider, MD ZAIRA Mcfadden, was informed about the recommended disposition of  observation while awaiting psychiatry consult. They are in support of the disposition.       Attending concurs with disposition: yes       Patient and/or validated legal guardian concurs with disposition:   yes       Final disposition:  observation                            Legal status: Voluntary/Patient has signed consent for treatment                                                                                                                                 Reviewed court records: yes       Assessment Details   Total duration spent with the patient: 60 min     CPT code(s) utilized: 82646 - Psychotherapy for Crisis - 60 (30-74*) min    KENNY Loza, Psychotherapist  [facility] - Triage & Transition Services  Callback: [phone]

## 2025-04-09 NOTE — PROGRESS NOTES
"Triage & Transition Services, Extended Care     Therapy Progress Note    Patient: Eveline goes by \"Eveline,\" uses she/her pronouns  Date of Service: 2025  Site of Service: [facility] Emergency Dept                             [id number]    Patient was seen yes  Mode of Assessment: In person    Presentation Summary: Patient was reading a magazine in her recliner when writer approached for reassessment.  She agreed to meet in consult room A.  Patient presents with slow speech, slowed responses, variable eye contact, yet future oriented with goal of wanting to feel better. She expresses being worried about here yet also worried about being at home.  She describes feeling agitated here by the sounds of the TV, random noises and sounds of the unit.  Writer offered patient interventions of ear plugs, radio headphones or noise canceling headphones.  Patient also indicates living with \"big emotions and feelings\" that are making navigating day to day life difficult.  Patient spoke about all of the triggers and events that are contributing to her dysfunction-  wanting the TV off, unemployed, living with daughter, car broken down, Medicare sign up,  sister's birthday last week, close friend's father , and 's death by heart attack retriggered.  Patient reports passive SI yesterday and notes that has decreased since being here.  Patient was validated for feeling overwhelmed, hopeless, and helpless.  Writer provided education on trauma therapy specifically Accelerated Resolution Therapy (ART) and patient will review information for a potential session tomorrow.  Patient agreed to stay in observation tonight.    Therapeutic Intervention(s) Provided: Engaged in cognitive restructuring/ reframing, looked at common cognitive distortions and challenged negative thoughts., Engaged in guided discovery, explored patient's perspectives and helped expand them through socratic dialogue., Engaged in " exposure therapy, having patient look at fears/fear hierarchy and utilize coping skills to face exposures., Coached on coping techniques/relaxation skills to help improve distress tolerance and managing intense emotions., Taught the link between thoughts, feelings, and behaviors., Identified and practiced coping skills.    Current Symptoms: excessive worry, anxious helplessness, hopelessness, withdrawal/isolation, negativistic, low self esteem anxious, excessive worry  (none reported)  (no concerns)    Mental Status Exam   Affect: Flat  Appearance: Appropriate  Attention Span/Concentration: Attentive  Eye Contact: Variable    Fund of Knowledge: Appropriate   Language /Speech Content: Fluent  Language /Speech Volume: Soft  Language /Speech Rate/Productions: Slow  Recent Memory: Intact  Remote Memory: Intact  Mood: Anxious, Sad, Depressed  Orientation to Person: Yes   Orientation to Place: Yes  Orientation to Time of Day: Yes  Orientation to Date: Yes     Situation (Do they understand why they are here?): Yes  Psychomotor Behavior: Normal  Thought Content: Clear  Thought Form: Intact    Treatment Objective(s) Addressed: rapport building, orienting the patient to therapy, processing feelings, identifying an appropriate aftercare plan, assessing safety, identifying additional supports, exploring obstacles to safety in the community    Patient Response to Interventions: eager to participate, acceptance expressed, verbalizes understanding    Progress Towards Goals: Patient Reports Symptoms Are: ongoing  Patient Progress Toward Goals: is not making progress  Comment: continues to experience anxiety, helpless, hopeless  Next Step to Work Toward Discharge: symptom stabilization  Symptom Stabilization Comment: reduce anxiety, engage in an ART session for trauma therapy    Case Management: Summary of Interaction: none; patient is calling her daughter's with the update independently    Plan: observation  yes provider, JUANA Waggoner  KM Bhatt  yes    Clinical Substantiation: Patient is going to be staying in observation tonight as she still struggles with anxiety, hopeless, helpless, and depression.  She will have medication changes from our provider.  Patient does not feel safe returning home tonight and acknowledges feeling agitated here even.  Her triggers here include the TV, news channels, random noises and sounds of medical equipment and people.  Patient is experiencing an overwhelming amount of situations where she does not feel like she control of a situation.  Patient was educated about ART therapy and will consider engaging in a session tomorrow.  Patient denies SI, HI, AH, VH, paranoia, and delusions.    Legal Status: Legal Status: Voluntary/Patient has signed consent for treatment    Session Status: Time session started: 1345  Time session ended: 1425  Session Duration (minutes): 40 minutes  Session Number: 1  Anticipated number of sessions or this episode of care: 2    Time Spent: 40 minutes    CPT Code: CPT Codes: 30388 - Psychotherapy (with patient) - 45 (38-52*) min    Diagnosis:   Patient Active Problem List   Diagnosis Code    Acute appendicitis with localized peritonitis K35.30    Closed fracture of right hip with routine healing S72.001D    Pain R52    Anticoagulated Z79.01    Bipolar disorder, current episode mixed, moderate (H) F31.62    ABLA (acute blood loss anemia) D62    Primary insomnia F51.01    Urinary retention R33.9    Drug-induced constipation K59.03    Mood disorder F39    Anxiety F41.9    Pyuria R82.81    Depression with anxiety F41.8    Passive suicidal ideations R45.851       Primary Problem This Admission: Active Hospital Problems    Depression with anxiety      Passive suicidal ideations      *Anxiety      Massimo Reis, RAMSEY, [facility]  Licensed Mental Health Professional (LMHP)  [facility], [phone]

## 2025-04-09 NOTE — PROGRESS NOTES
Pt reports feeling calmer following medication. Vitals reassessed, /79, HR 98. Requests to remain in consult room A. Informed she will need to move to recliner if consult room is needed by provider and she voiced understanding.

## 2025-04-09 NOTE — ED PROVIDER NOTES
"[facility] Unit - Initial Psychiatric Observation Note  [facility] Emergency Department  Observation Initiation Date: Apr 8, 2025    Sherrie L Swanson Behboudi MRN: [id number]   Age: 65 year old YOB: 1960     History     Chief Complaint   Patient presents with    Mental Health Problem     HPI  Sherrie L Swanson Behboudi is a 65 year old female with a past history notable for generalized anxiety disorder, depression, questionable bipolar disorder, and history of cannabis use.  Patient presented to the emergency department for evaluation of worsening anxiety symptoms.  Patient was recently evaluated at [facility] from 3/30 to 3/31/25 with similar symptoms.  Patient was medically examined in the emergency department and determined to be medically stable for transfer to [facility] for further psychiatric assessment.  Patient is nearing 22.5 hours in emergency care.    On interview today, patient reports that she has still not been feeling well recently, describing significant ongoing anxiety with difficulty coping.  She reports that her anxiety is triggered by many different things, including news being on the TV here.  She recalls that last evening, she was in the waiting room awaiting transfer to [facility] and witnessed the man experience a heart attack.  She notes that this led to heightened anxiety as she watched her late  passed away from a heart attack.  She reports that at home, she is feeling \"scared\" and is experiencing difficulty completing her day-to-day activities.  She endorses feeling afraid to take the dog for a walk, fearful that she may drop the leash and he may run away.  She recalls a time where she did drop the leash and instructed the dog to \"stop,\" which he did.  She also mentions feeling more afraid to take the dog out to the bathroom in the middle of the night.  She indicates that some nights, she is waking up with palpitations and anxiety.  Appetite has been low.  She is experiencing " "difficulty focusing on and completing tasks.  She has experienced passive suicidal thoughts recently, but denies any active thoughts, plans, or intent to end her life.  She is seeking further guidance on medication and reports that she does not feel well enough to return home today, but also feels a bit worried being here, stating \"I do not know if I will be able to sleep with all the machines beeping and people having heart attacks.\"  Patient was reassured that she is in the [facility] unit and the other patients here have been medically assessed and cleared.      Past Medical History  Past Medical History:   Diagnosis Date    Bipolar I disorder (H)     Chronic nausea     Depression with anxiety     Hyperlipidemia     Medical cannabis use      Past Surgical History:   Procedure Laterality Date    LAPAROSCOPIC APPENDECTOMY N/A 10/25/2018    Procedure: LAPAROSCOPIC APPENDECTOMY;  Surgeon: Danilo Jennings MD;  Location:  OR     acetaminophen (TYLENOL) 500 MG tablet  albuterol (PROAIR HFA/PROVENTIL HFA/VENTOLIN HFA) 108 (90 Base) MCG/ACT inhaler  Calcium Carb-Cholecalciferol (CALCIUM 600 + D PO)  dicyclomine (BENTYL) 10 MG capsule  divalproex sodium extended-release (DEPAKOTE ER) 500 MG 24 hr tablet  escitalopram (LEXAPRO) 5 MG tablet  hydrOXYzine HCl (ATARAX) 25 MG tablet  methylcellulose (CITRUCEL) 500 MG TABS tablet  mirtazapine (REMERON) 7.5 MG tablet  polyethylene glycol (MIRALAX) 17 g packet  senna-docusate (SENOKOT-S/PERICOLACE) 8.6-50 MG tablet  traZODone (DESYREL) 50 MG tablet  vitamin B-12 (CYANOCOBALAMIN) 1000 MCG tablet  Vitamin D3 (CHOLECALCIFEROL) 25 mcg (1000 units) tablet  aspirin 81 MG EC tablet  folic acid (FOLVITE) 1 MG tablet  gabapentin (NEURONTIN) 400 MG capsule  HYDROmorphone (DILAUDID) 2 MG tablet  vitamin D3 (CHOLECALCIFEROL) 1.25 MG (77577 UT) capsule      Allergies   Allergen Reactions    Dust Mites     Pcn [Penicillins] Unknown     Family History  No family history on file.  Social " "History   Social History     Tobacco Use    Smoking status: Never    Smokeless tobacco: Never   Substance Use Topics    Alcohol use: Yes    Drug use: Yes     Comment: Medical Cannibis          Review of Systems  A medically appropriate review of systems was performed with pertinent positives and negatives noted in the HPI, and all other systems negative.    Physical Examination   BP: (!) 177/72  Pulse: 95  Temp: 97.6  F (36.4  C)  Resp: 18  Height: 170.2 cm (5' 7\")  Weight: 75.1 kg (165 lb 8 oz)  SpO2: 99 %    Physical Exam  General: Appears stated age.   Neuro: Alert and fully oriented. Extremities appear to demonstrate normal strength on visual inspection.   Integumentary/Skin: no rash visualized, normal color    Psychiatric Examination   Appearance: awake, alert, adequately groomed, dressed in hospital scrubs, and appeared as age stated  Attitude:  cooperative  Eye Contact:  fair  Mood:  anxious  Affect:  mood congruent and stable  Speech:  clear, coherent  Psychomotor Behavior:  no evidence of tardive dyskinesia, dystonia, or tics  Thought Process:  linear  Associations:  no loose associations  Thought Content:  passive suicidal ideation present, no auditory hallucinations present, no visual hallucinations present, and no homicidal thinking  Insight:  fair  Judgement:  fair  Oriented to:  time, person, and place  Attention Span and Concentration:  fair  Recent and Remote Memory:  fair  Language: able to name/identify objects without impairment  Fund of Knowledge: intact with awareness of current and past events    ED Course        Labs Ordered and Resulted from Time of ED Arrival to Time of ED Departure   ROUTINE UA WITH MICROSCOPIC REFLEX TO CULTURE - Abnormal       Result Value    Color Urine Light Yellow      Appearance Urine Clear      Glucose Urine Negative      Bilirubin Urine Negative      Ketones Urine 10 (*)     Specific Gravity Urine 1.021      Blood Urine Negative      pH Urine 6.0      Protein " "Albumin Urine Negative      Urobilinogen Urine Normal      Nitrite Urine Negative      Leukocyte Esterase Urine Small (*)     Mucus Urine Present (*)     RBC Urine 1      WBC Urine 4      Squamous Epithelials Urine <1         Assessments & Plan (with Medical Decision Making)   Patient presenting with ongoing anxiety symptoms over the last week since returning home from [facility].  Patient endorses feeling \"scared\" and unable to be on her own.  Sleep apparently remains poor and her daughter is needing to remind her to eat.  Some concern for possible cognitive impairment.  We will plan to increase mirtazapine to further target anxiety symptoms.  Nursing notes reviewed noting no acute issues.     I have reviewed the assessment completed by the [facility].     During the observation period, the patient did not require medications for agitation, and did not require restraints/seclusion for patient and/or provider safety.     The patient was found to have a psychiatric condition that would benefit from an observation stay in the emergency department for further psychiatric stabilization and/or coordination of a safe disposition. The observation plan includes serial assessments of psychiatric condition, potential administration of medications if indicated, further disposition pending the patient's psychiatric course during the monitoring period.     Preliminary diagnosis:    ICD-10-CM    1. Passive suicidal ideations  R45.851       2. THOMAS (generalized anxiety disorder)  F41.1       3. Mood disorder  F39            Treatment Plan:  -Increase mirtazapine from 7.5 mg nightly to 15 mg nightly to target ongoing anxiety symptoms. Consider further increase to 22.5 mg prior to discharge.   -Continue Lexapro 5 mg daily for treatment of anxiety and depressed mood  -Continue Depakote  mg nightly for mood stabilization  -Ordered clonidine 0.1 mg 3 times daily as needed for anxiety.  Blood pressure and pulse have been elevated.  " Alternatively, may utilize hydroxyzine 25 mg every 6 hours as needed for anxiety  -Trazodone 50 mg nightly as needed for sleep  -Consider referral for outpatient neuropsychiatric testing to rule out cognitive impairment  -Patient may benefit from DBT or IOP referral upon discharge  -Urinalysis results reviewed with small amount of leukocyte esterase.  Urine culture from 3/30 urine specimen reviewed and was notable for mixed urogenital thalia, which is not typically treated.  Patient did complete a 5-day course of Keflex.  -Patient has been registered to observation status, reassess tomorrow    Patient evaluated by Butch Bhatt CNP on 4/9/25.    --  Butch Bhatt CNP   [facility] EMERGENCY DEPT  [facility] Unit      Butch Bhatt CNP  04/09/25 [id number]       Butch Bhatt CNP  04/10/25 [id number]

## 2025-04-09 NOTE — PLAN OF CARE
Eveline Britt Behboudi  April 8, 2025  Plan of Care Hand-off Note     Patient Recommended Care Path: observation    Clinical Substantiation:  A lower level of care has been unsuccessful in treating and stabilizing patient's mental health symptoms of anxiety, worry, and feeling unable to care for herself. Although the patient does not meet the criteria for full inpatient psychiatric hospitalization, they have been found to have a psychiatric symptoms of anxiety, alogia, and fears of being alone;  that could be benefited from an observation stay in the emergency department for further psychiatric stabilization and/or coordination of a safe disposition.   Patient will remain in the Emergency Department under observation for continued monitoring, and therapeutic interventions for mental health symptoms.The observation plan includes serial assessments of psychiatric condition, potential administration of medications if indicated, further disposition pending the patient's psychiatric course during the monitoring period. Patient is not able to safety plan at the time of the initial assessment and would benefit from a period of stabilization to again attempt to safety plan for discharge.   The patient reports that she as an outpatient psychiatrist who recently made a medication adjustment. Eveline reports the following protective factors; her strong relationship with her daughters.   Eveline is open to meeting with the psychiatry provider to explore possible medicaitons for symptoms stabilization.    Goals for crisis stabilization:  Increase the pts ability to discharge safely to home. Establish follow up apt for current psych provider.    Next steps for Care Team:  Pt to be trasnferred to the [facility] unit once a bed is made available.    Treatment Objectives Addressed:  processing feelings, assessing safety    Therapeutic Interventions:  Explored strategies for self-soothing., Explored and identified early warning signs  to feeling unsafe at home.    Has a specific means been identified for suicidal.homicide actions: No    Patient coping skills attempted to reduce the crisis:  Prior to arrival, patient attempted to utilize taking her medications and seeking support from her daughters.  Once in the Emergency Department, this writer attempted to engage with Eveline by offering therapeutic listening and exploration into the presenting and precipitating events. This writer attempted to build rapport in a non confrontational manner, providing the patient to express feelings and thoughts as fully as possible. Patient was provided with a quiet area and time for them to decompress and calm their emotional expression.   Eveline was receptive to these interventions.    Collateral contact information:  Ishan Behboudi (Daughter)  [phone]    Legal Status: Voluntary/Patient has signed consent for treatment                            Reviewed court records: yes     Psychiatry Consult: patient to be seen on [facility]    KENNY Loza, Psychotherapist  [facility] - Triage & Transition Services  Callback: [phone]

## 2025-04-09 NOTE — PROGRESS NOTES
Pt very anxious this morning. Hyperventilating and shaking in recliner. Pt moved to consult room A and RN sat and talked with patient. Practiced deep breathing and grounding. Pt having difficulty getting words out, reports this happens when she feels anxious, especially worse in the mornings. Pt reports news on TV is triggering for her. Feels more comfortable in private area, unfortunately sensory room not available right now so patient sitting in consult room. Warm blankets, ice pack for forehead provided. Hydroxyzine 25 given at 0748. BP elevated 169/93, provider notified and clonidine given at 0838.

## 2025-04-09 NOTE — ED NOTES
Eveline is a 65 year old  female with received from ED due to fleeting thoughts of suicidal ideation. Reports she was here about one week ago and thinks she may need a higher dose of Lexapro. Patient currently endorses fleeting thoughts of suicide but contracts for safety. Patient denies HI & denies hallucinations.    Observation sheet given to patient and discussed.    Nursing and risk assessments completed. Assessments reviewed with LMHP and physician. Admission information reviewed with patient. Patient given a tour of [facility] and instructions on using the facility. Questions regarding [facility] addressed. Pt safety search completed.

## 2025-04-10 VITALS
RESPIRATION RATE: 16 BRPM | SYSTOLIC BLOOD PRESSURE: 152 MMHG | TEMPERATURE: 98.1 F | HEART RATE: 89 BPM | BODY MASS INDEX: 25.98 KG/M2 | DIASTOLIC BLOOD PRESSURE: 74 MMHG | HEIGHT: 67 IN | WEIGHT: 165.5 LBS | OXYGEN SATURATION: 98 %

## 2025-04-10 PROCEDURE — G0378 HOSPITAL OBSERVATION PER HR: HCPCS

## 2025-04-10 PROCEDURE — 99233 SBSQ HOSP IP/OBS HIGH 50: CPT | Mod: 95 | Performed by: NURSE PRACTITIONER

## 2025-04-10 PROCEDURE — 250N000013 HC RX MED GY IP 250 OP 250 PS 637: Performed by: PSYCHIATRY & NEUROLOGY

## 2025-04-10 PROCEDURE — 250N000013 HC RX MED GY IP 250 OP 250 PS 637: Performed by: NURSE PRACTITIONER

## 2025-04-10 RX ORDER — QUETIAPINE FUMARATE 25 MG/1
25-50 TABLET, FILM COATED ORAL 4 TIMES DAILY PRN
Status: DISCONTINUED | OUTPATIENT
Start: 2025-04-10 | End: 2025-04-11 | Stop reason: HOSPADM

## 2025-04-10 RX ORDER — DESVENLAFAXINE 25 MG/1
25 TABLET, EXTENDED RELEASE ORAL DAILY
Status: DISCONTINUED | OUTPATIENT
Start: 2025-04-11 | End: 2025-04-11 | Stop reason: HOSPADM

## 2025-04-10 RX ADMIN — ESCITALOPRAM OXALATE 5 MG: 5 TABLET, FILM COATED ORAL at 07:36

## 2025-04-10 RX ADMIN — MIRTAZAPINE 15 MG: 15 TABLET, FILM COATED ORAL at 21:08

## 2025-04-10 RX ADMIN — METHYLCELLULOSE 1000 MG: 500 TABLET ORAL at 07:35

## 2025-04-10 RX ADMIN — CLONIDINE HYDROCHLORIDE 0.1 MG: 0.1 TABLET ORAL at 07:36

## 2025-04-10 RX ADMIN — Medication 600 MG: at 07:35

## 2025-04-10 RX ADMIN — METHYLCELLULOSE 1000 MG: 500 TABLET ORAL at 21:08

## 2025-04-10 RX ADMIN — Medication 600 MG: at 18:02

## 2025-04-10 RX ADMIN — HYDROXYZINE HYDROCHLORIDE 25 MG: 25 TABLET ORAL at 13:26

## 2025-04-10 RX ADMIN — DIVALPROEX SODIUM 500 MG: 500 TABLET, FILM COATED, EXTENDED RELEASE ORAL at 21:08

## 2025-04-10 RX ADMIN — QUETIAPINE FUMARATE 25 MG: 25 TABLET ORAL at 21:10

## 2025-04-10 RX ADMIN — Medication 25 MCG: at 07:36

## 2025-04-10 ASSESSMENT — ACTIVITIES OF DAILY LIVING (ADL)
ADLS_ACUITY_SCORE: 42

## 2025-04-10 ASSESSMENT — COLUMBIA-SUICIDE SEVERITY RATING SCALE - C-SSRS
SUICIDE, SINCE LAST CONTACT: NO
2. HAVE YOU ACTUALLY HAD ANY THOUGHTS OF KILLING YOURSELF?: NO
TOTAL  NUMBER OF INTERRUPTED ATTEMPTS SINCE LAST CONTACT: NO
TOTAL  NUMBER OF ABORTED OR SELF INTERRUPTED ATTEMPTS SINCE LAST CONTACT: NO
1. SINCE LAST CONTACT, HAVE YOU WISHED YOU WERE DEAD OR WISHED YOU COULD GO TO SLEEP AND NOT WAKE UP?: YES
6. HAVE YOU EVER DONE ANYTHING, STARTED TO DO ANYTHING, OR PREPARED TO DO ANYTHING TO END YOUR LIFE?: NO
ATTEMPT SINCE LAST CONTACT: NO

## 2025-04-10 NOTE — PLAN OF CARE
Eveline L Swanson Behboudi  April 10, 2025  Plan of Care Hand-off Note     Patient Recommended Care Path: observation    Clinical Substantiation:  Pt will remain at [facility] overnight for medication manangement and further observation. Pt continues to report feelings of anxiety, hopelessness, and passive suicidal ideation with no plan or intent. Pt denies HI/AH/VH. Pt has an established outpatient mental health providers for therapy and medication manangement. Pt could benefit from IOP referrals and safety planning upon discharge.    Goals for crisis stabilization:  Increase the pts ability to discharge safely to home, follow up w/ established outpatient mental health providers.    Next steps for Care Team: Reassessment, safety planning, and referrals for IOP.    Treatment Objectives Addressed:  processing feelings, assessing safety    Therapeutic Interventions:  Explored strategies for self-soothing.    Has a specific means been identified for suicidal.homicide actions: No  If yes, describe:    Explain action steps toward mitigation:    Document completion of mitigation action:    The follow up action still needed prior to discharge:      Patient coping skills attempted to reduce the crisis:  Prior to arrival, patient attempted to utilize taking her medications and seeking support from her daughters.  Once in the Emergency Department, this writer attempted to engage with Eveline by offering therapeutic listening and exploration into the presenting and precipitating events. This writer attempted to build rapport in a non confrontational manner, providing the patient to express feelings and thoughts as fully as possible. Patient was provided with a quiet area and time for them to decompress and calm their emotional expression.   Eveline was receptive to these interventions.                          Collateral contact information:  Ishan Behboudi (Daughter)  [phone]    Legal Status: Voluntary/Patient has signed  consent for treatment                                                                                                                                 Reviewed court records: yes     Psychiatry Consult: Yes, Radha Recinos.    CARLITOS Toledo

## 2025-04-10 NOTE — PROGRESS NOTES
"Triage and Transition Services Extended Care Reassessment     Patient: Eveline goes by \"Eveline,\" uses she/her pronouns  Date of Service: April 10, 2025  Site of Service: [facility] Emergency Dept                             [id number]  Patient was seen yes  Mode of Assessment: In person     Reason for Reassessment: depression, worsening psychosocial stress    History of Patient's Original Emergency Room Encounter: Pt is a 65 year old female who presents to the ED with worsening anxiety and depression, she reports she is unable to care for herself at home. She was brought in by her two adult daughters. Pt presents flat, anxious and cooperative. She is not currently working and lives with her daughter and her dog. Pt reports she is struggling to take the dog out as needed lately. Pt was assessed in the ED last week with similar presentation and spent a night at [facility]. Pt does not have a hx previous inpatient psychiatric hospitalization, suicide attempts or self injurious behaviors. Pt works w/ a therapist and medication provider at [facility] [employer]. Pt denies SI/HI/AH/VH.    Current Patient Presentation: Pt observed to be sitting in her chair on the milieu, she was agreeable to a therapeutic check in w/ [facility]. Pt observed to moving, speaking, and responding slowly. Per nursing staff, she received a PRN for anxiety this afternoon. During check in, pt is requesting more medication to help calm herself. Pt endorses feelings of hopelessness, anxiety, poor appetite and some passive suicidal ideation with no plans or intent. Pt reports having \"big feelings\"and new symptoms of heart palpitations, sweating, and feeling scared after she stopped taking prescribed Seroquel. Pt reports her provider started tapering her Seroquel and added a new medication a few weeks ago. Pt endorses psychosocial stressors; seeking employment, she hasn't worked since March 2024 and says she has no money to fix her car, she lives w/ her " "adult daughter. Yesterday, pt witnessed a man have a heart attack and collapse in triage yesterday while waiting to be seen,  she was triggered because she lost her ex-/father to her children in 2019 due to a heart attack. Pt was able to identify protective factors: supportive daughters, friends, and family. Pt denies HI and no AH/VH at this time. Pt wants to remain at [facility] overnight for medication management and further observation.    Presentation Summary: Pt agreeable to therapeutic check in w/ LMHP.  She is observed to moving, speaking, and responding slowly. Pt is alert, oriented x 4 and cooperative. Pt denies HI and no AH/VH at this time. During check in, pt is requesting more medication to help calm herself. Pt endorses feelings of hopelessness, anxiety, poor appetite and some passive suicidal ideation with no plans or intent. Pt reports having \"big feelings\" and new symptoms of heart palpitations, sweating, and feeling scared after she stopped taking prescribed Seroquel. Pt reports her provider started tapering her Seroquel and added a new medication a few weeks ago. Pt endorses psychosocial stressors; seeking employment, she hasn't worked since March 2024 and says she has no money to fix her car, she lives w/ her adult daughter. Yesterday, pt witnessed a man have a heart attack and collapse in triage yesterday while waiting to be seen, she was triggered because she lost her ex-/father to her children in 2019 due to a heart attack. Pt was able to identify protective factors: supportive daughters, friends, and family. Pt wants to remain at [facility] overnight for medication management and further observation.    Changes Observed Since Initial Assessment: patient/family request    Therapeutic Interventions Provided: Reviewed healthy living that supports positive mental health, including looking at sleep hygiene, regular movement, nutrition, and regular socialization., Provided positive " reinforcement for progress towards goals, gains in knowledge, and application of skills previously taught.    Current Symptoms: excessive worry, anxious, racing thoughts helplessness, hoplessness, withdrawal/isolation, negativistic, low self esteem, avoidance, impaired decision making, sadness, thoughts of death/suicide anxious, excessive worry  (none reported) loss of appetite (Pt reports disturbed appetite but continues to eat because she knows its important for medication management.)    Mental Status Exam   Affect: Flat  Appearance: Appropriate  Attention Span/Concentration: Attentive  Eye Contact: Variable    Fund of Knowledge: Delayed   Language /Speech Content: Fluent  Language /Speech Volume: Soft  Language /Speech Rate/Productions: Slow  Recent Memory: Intact  Remote Memory: Intact  Mood: Anxious  Orientation to Person: Yes   Orientation to Place: Yes  Orientation to Time of Day: Yes  Orientation to Date: Yes     Situation (Do they understand why they are here?): Yes  Psychomotor Behavior: Underactive  Thought Content: Suicidal  Thought Form: Intact    Treatment Objective(s) Addressed: rapport building, identifying and practicing coping strategies, identifying an appropriate aftercare plan, safety planning, assessing safety    Patient Response to Interventions: verbalizes understanding    Progress Towards Goals:  Patient Reports Symptoms Are: ongoing  Patient Progress Toward Goals: is not making progress (Pt denies she is feeling better or making progress.)  Comment: continues to experience anxiety, helpless, hopeless  Next Step to Work Toward Discharge: symptom stabilization, patient ability to engage in safety planning  Symptom Stabilization Comment: Reduce anxiety and engage in safety planning.    Case Management: N/A   C-SSRS Since Last Contact:   1. Wish to be Dead (Since Last Contact): Yes  2. Non-Specific Active Suicidal Thoughts (Since Last Contact): No     Actual Attempt (Since Last Contact):  No  Has subject engaged in non-suicidal self-injurious behavior? (Since Last Contact): No  Interrupted Attempts (Since Last Contact): No  Aborted or Self-Interrupted Attempt (Since Last Contact): No  Preparatory Acts or Behavior (Since Last Contact): No  Suicide (Since Last Contact): No     Calculated C-SSRS Risk Score (Since Last Contact): Low Risk    Plan: Final Disposition / Recommended Care Path: observation  Plan for Care reviewed with assigned Medical Provider: yes  Plan for Care Team Review: provider, RN  Comments: Radha Recinos CNP  Patient and/or validated legal guardian concurs: yes  Clinical Substantiation: Pt wants to remain at [facility] overnight for medication management and further observation. Pt continues to report feelings of anxiety, hopelessness, and passive suicidal ideation with no plan or intent. Pt denies HI/AH/VH. Pt has an established outpatient mental health providers for therapy and medication management,    Legal Status: Legal Status: Voluntary/Patient has signed consent for treatment    Session Status: Time session started: 1445  Time session ended: 1503  Session Duration (minutes): 22 minutes  Session Number: 2  Anticipated number of sessions or this episode of care: 3    Session Start Time: 1445  Session Stop Time: 1503  CPT codes: 81857 - Psychotherapy (with patient) - 30 (16-37*) min  Time Spent: 22 minutes      CPT code(s) utilized: 16994 - Psychotherapy (with patient) - 30 (16-37*) min    Diagnosis:   Patient Active Problem List   Diagnosis Code    Acute appendicitis with localized peritonitis K35.30    Closed fracture of right hip with routine healing S72.001D    Pain R52    Anticoagulated Z79.01    Bipolar disorder, current episode mixed, moderate (H) F31.62    ABLA (acute blood loss anemia) D62    Primary insomnia F51.01    Urinary retention R33.9    Drug-induced constipation K59.03    Mood disorder F39    Anxiety F41.9    Pyuria R82.81    Depression with anxiety F41.8    Passive  suicidal ideations R45.851       Primary Problem This Admission: Active Hospital Problems    Depression with anxiety      Passive suicidal ideations      *Anxiety        CARLITOS Toledo   Licensed Mental Health Professional (LMHP), Extended Care  [phone]

## 2025-04-10 NOTE — PROGRESS NOTES
"Pt requested hydroxyzine which was given. RN talked with patient, she shares that she is not feeling well and has no energy or desire to get better. States her emotional responses to basic everyday tasks such as watching TV, eating meals, talking to her daughter is feeling \"way too big\". She was considering completing an ART therapy session with [facility] today but states \"even thinking about it is exhausting\". Pt worries she will feel depressed forever \"never be myself again\". RN provided active listening and reassurance to pt. Offered aromatherapy.  "

## 2025-04-10 NOTE — PROGRESS NOTES
"RN re-introduced self and informed patient on plan of care. Vitals assessed, BP elevated 149/84 vitals otherwise WNL. Compliant with scheduled medications and PRN clonidine given for anxiety and elevated BP.     Pt reports poor sleep overnight. When asked how she is feeling today, she states \"I don't feel good\".  During RN assessment, pt makes eye contact with writer but has long response lag and at times just breathes very deeply without answering question at all. She did continue to take bites of her oatmeal and Thai toast during conversation, however, was not responding to questions from RN. Eventually, pt stated, \"I was surprised to wake up here today\". She was unable to elaborate further on this. RN encouraged patient to continue eating breakfast and will check in again later this morning. Pt nodded her head in response.  "

## 2025-04-10 NOTE — ED NOTES
Pt has been resting in her chair reading magazines or watching TV. Pt was feeling a bit of anxiety later in the evening and requested some PRN hydroxyzine however she has been doing well since. Pt did have some fear of another pt that was being transported off the unit. Pt was unable  to identify whey she was afraid but felt more scared. T notes that she still feels scared being here in the hospital and does feel hopeless. Pt denies SI.Plan to reassess in the morning .

## 2025-04-11 VITALS
OXYGEN SATURATION: 99 % | WEIGHT: 165.5 LBS | TEMPERATURE: 98 F | BODY MASS INDEX: 25.98 KG/M2 | HEART RATE: 107 BPM | HEIGHT: 67 IN | RESPIRATION RATE: 18 BRPM | SYSTOLIC BLOOD PRESSURE: 169 MMHG | DIASTOLIC BLOOD PRESSURE: 78 MMHG

## 2025-04-11 PROCEDURE — 250N000013 HC RX MED GY IP 250 OP 250 PS 637: Performed by: NURSE PRACTITIONER

## 2025-04-11 PROCEDURE — 250N000013 HC RX MED GY IP 250 OP 250 PS 637: Performed by: PSYCHIATRY & NEUROLOGY

## 2025-04-11 PROCEDURE — 99239 HOSP IP/OBS DSCHRG MGMT >30: CPT | Performed by: PSYCHIATRY & NEUROLOGY

## 2025-04-11 PROCEDURE — G0378 HOSPITAL OBSERVATION PER HR: HCPCS

## 2025-04-11 PROCEDURE — 87086 URINE CULTURE/COLONY COUNT: CPT | Performed by: PSYCHIATRY & NEUROLOGY

## 2025-04-11 RX ORDER — DESVENLAFAXINE 50 MG/1
50 TABLET, FILM COATED, EXTENDED RELEASE ORAL DAILY
Qty: 15 TABLET | Refills: 1 | Status: SHIPPED | OUTPATIENT
Start: 2025-04-11

## 2025-04-11 RX ORDER — QUETIAPINE FUMARATE 25 MG/1
12.5-25 TABLET, FILM COATED ORAL 2 TIMES DAILY PRN
Qty: 15 TABLET | Refills: 0 | Status: SHIPPED | OUTPATIENT
Start: 2025-04-11

## 2025-04-11 RX ADMIN — CLONIDINE HYDROCHLORIDE 0.1 MG: 0.1 TABLET ORAL at 16:39

## 2025-04-11 RX ADMIN — Medication 600 MG: at 08:16

## 2025-04-11 RX ADMIN — HYDROXYZINE HYDROCHLORIDE 25 MG: 25 TABLET ORAL at 11:09

## 2025-04-11 RX ADMIN — Medication 25 MCG: at 08:16

## 2025-04-11 RX ADMIN — DESVENLAFAXINE 25 MG: 25 TABLET, EXTENDED RELEASE ORAL at 08:16

## 2025-04-11 RX ADMIN — METHYLCELLULOSE 1000 MG: 500 TABLET ORAL at 08:16

## 2025-04-11 ASSESSMENT — ACTIVITIES OF DAILY LIVING (ADL)
ADLS_ACUITY_SCORE: 42

## 2025-04-11 ASSESSMENT — COLUMBIA-SUICIDE SEVERITY RATING SCALE - C-SSRS
TOTAL  NUMBER OF INTERRUPTED ATTEMPTS SINCE LAST CONTACT: NO
1. SINCE LAST CONTACT, HAVE YOU WISHED YOU WERE DEAD OR WISHED YOU COULD GO TO SLEEP AND NOT WAKE UP?: YES
SUICIDE, SINCE LAST CONTACT: NO
ATTEMPT SINCE LAST CONTACT: NO
2. HAVE YOU ACTUALLY HAD ANY THOUGHTS OF KILLING YOURSELF?: NO
TOTAL  NUMBER OF ABORTED OR SELF INTERRUPTED ATTEMPTS SINCE LAST CONTACT: NO
REASONS FOR IDEATION SINCE LAST CONTACT: DOES NOT APPLY
6. HAVE YOU EVER DONE ANYTHING, STARTED TO DO ANYTHING, OR PREPARED TO DO ANYTHING TO END YOUR LIFE?: NO

## 2025-04-11 NOTE — PROGRESS NOTES
"Triage and Transition Services Extended Care Reassessment     Patient: Eveline goes by \"Eveline,\" uses she/her pronouns  Date of Service: April 11, 2025  Site of Service: [facility] Emergency Dept                             [id number]  Patient was seen yes  Mode of Assessment: In person     Reason for Reassessment: depression, worsening psychosocial stress, other (see comment) (Anxiety/panic attacks)    History of Patient's Original Emergency Room Encounter: Sherrie L Swanson Behboudi is a 65 year old female who presents to the Emergency Department due to worsening anxiety and feeling unable to care for herself at home. Per chart review, collateral information and patient report, they have a history of anxiety and depression. Patient was brought in by her adult daughter from her home in the community.   Patient presents flat, anxious, yet cooperative.   Patient has had similar episodes as today's presentation; she was seen in the Emergency Department and [facility] one week ago.   Patient has no history of previous inpatient psychiatric hospitalization.   Patient lives with her daughter and her dog.   She reports struggling to take the dog out as needed lately.   Patient has no history of suicide attempt nor self injurious behavior   Patient does have an outpatient psychiatry provider, however the provider is out of office until tomorrow. Patient does not feel like she can wait to be seen by her outpatient provider and is requesting to remain on observation.    Current Patient Presentation: Anxious    Presentation Summary: Pt was awake and sitting in chair when approached by writer. Pt was agreeable to meet. Pt appeared anxious during interaction as observed by deep breathing, hyperventilating, chewing on nails, and gentle rocking. Pt continues to report feeling anxious and \"out of control\". Pt states that current politics, TV shows, and feeling like she cannot take care of herself all cause her great " "anxiety. Pt endorses experiencing panic attacks where she is short of breath, has heart palpitations, and shakiness. Pt does vocalize using ice packs and deep breathing as coping techniques and states they work sometimes, but \"not enough\". Pt has passive SI of wanting to fall asleep and not wake up. Pt denies any plan/intent to hurt herself. Pt denies AH/VH/HI.    Changes Observed Since Initial Assessment: decrease in presenting symptoms    Therapeutic Interventions Provided: Engaged in guided discovery, explored patient's perspectives and helped expand them through socratic dialogue.    Current Symptoms: anxious, shortness of breath or racing heart, excessive worry, racing thoughts, panic attack thoughts of death/suicide, hopelessness, helplessness anxious, shortness of breath or racing heart, excessive worry, racing thoughts, somatic symptoms (abdominal pain, headache, tension), sweating, flushing, shaking  (None reported) loss of appetite    Mental Status Exam   Affect: Flat  Appearance: Appropriate  Attention Span/Concentration: Attentive  Eye Contact: Variable    Fund of Knowledge: Appropriate   Language /Speech Content: Fluent  Language /Speech Volume: Normal  Language /Speech Rate/Productions: Slow  Recent Memory: Intact  Remote Memory: Intact  Mood: Sad, Anxious  Orientation to Person: Yes   Orientation to Place: Yes  Orientation to Time of Day: Yes  Orientation to Date: Yes     Situation (Do they understand why they are here?): Yes  Psychomotor Behavior: Normal  Thought Content: Clear  Thought Form: Intact    Treatment Objective(s) Addressed: rapport building, assessing safety, identifying treatment goals, identifying and practicing coping strategies, processing feelings, identifying an appropriate aftercare plan, identifying additional supports    Patient Response to Interventions: acceptance expressed, verbalizes understanding    Progress Towards Goals:  Patient Reports Symptoms Are: ongoing  Patient " "Progress Toward Goals: other (Progress is ongoing)  Comment: Pt reports feeling \"safe\" at Em[facility], but continues to show dysregulation. Pt has been observed hyperventilating, gently rocking, biting finger nails.  Next Step to Work Toward Discharge: symptom stabilization, collaboration with OP team/family/friends, patient ability to engage in safety planning  Symptom Stabilization Comment: Reduce anxiety and engage in safety planning.  Ability to Engage Comment: Pt was able to engage in safety/coping planning. Pt identified coping skills, supportive people, and comfort activities she can try when she is at home and feeling anxious.  Collaboration Comment: Collaborate with Pt's daughter Sunny to inform her of Pt's safety/coping plan for futher OP support.    Case Management: Case Management Included: collaborating with patient's support system  Details on Collaborating with Patient's Support System: Spoke with Pt's daughter Sunny ([phone])  Summary of Interaction: Spoke with Pt's daughter about Pt's identified coping skills and providing support to Pt upon discharge. Pt lives with daughter and daughter reports that she works in mental health and feels comfortable supporting mom at home. Pt's daughter was comfortable with working towards an IOP/PHP level of care.    C-SSRS Since Last Contact:   1. Wish to be Dead (Since Last Contact): Yes  2. Non-Specific Active Suicidal Thoughts (Since Last Contact): No  Most Severe Ideation Rating (Since Last Contact): 1  Frequency (Since Last Contact): 2-5 times in week  Duration (Since Last Contact): Fleeting, few seconds or minutes  Controllability (Since Last Contact): Can control thoughts with little difficulty  Deterrents (Since Last Contact): Does not apply  Reasons for Ideation (Since Last Contact): Does not apply  Actual Attempt (Since Last Contact): No  Has subject engaged in non-suicidal self-injurious behavior? (Since Last Contact): No  Interrupted Attempts (Since " "Last Contact): No  Aborted or Self-Interrupted Attempt (Since Last Contact): No  Preparatory Acts or Behavior (Since Last Contact): No  Suicide (Since Last Contact): No     Calculated C-SSRS Risk Score (Since Last Contact): Low Risk    Plan: Final Disposition / Recommended Care Path: discharge  Plan for Care reviewed with assigned Medical Provider: yes  Plan for Care Team Review: provider  Comments: Dr. Lopez  Patient and/or validated legal guardian concurs: yes    Clinical Substantiation: It is the recommendation of this writer that the Pt discharge back to the community. Pt's symptoms of anxiety including feelings of panic, shortness of breath, worry, racing heart, etc. continue to be displayed with at [facility]. However, Pt has been able to utilize coping skills. Pt has been observed engaging in breath work and asking for ice packs when she feels she is in need of them. During interaction Pt identified multiple stressors such as world politics, TV shows, and worrying about taking care of herself as triggers to her anxiety. Writer affirmed to Pt that she has been taking care of herself both at home and while on EmPATH using coping skills and engaging ADLs such as showering and eating. Pt lives with her daughter Sunny who works in mental health and is able to provide support upon discharge. Pt is currently followed by OP therapy and psychiatry. Pt meets with OP providers regularly. Pt was open to engaging in IOP/PHP to better address her current anxiety. Writer scheduled Pt with a navigator hub phone call. Pt continues to endorse passive SI, but states the thoughts of SI are \"scary\" and that she does not \"want to hurt myself or anyone else\". Pt does not have a plan or intent to harm self and is able to engaging in coping skills. Based on the Pt's current presentation, outpatient supports, help-seeking behavior, treatment engagement, and ability to use coping skills discharge home is the least restrictive level of " care.    Legal Status: Legal Status: Voluntary/Patient has signed consent for treatment    Session Status: Time session started: 0809  Time session ended: 0902  Session Duration (minutes): 53 minutes  Session Number: 4  Anticipated number of sessions or this episode of care: 4    Session Start Time: 0809  Session Stop Time: 0902  CPT codes: 74513 - Psychotherapy (with patient) - 60 (53+*) min  Time Spent: 53 minutes      CPT code(s) utilized: 90809 - Psychotherapy (with patient) - 60 (53+*) min    Diagnosis:   Patient Active Problem List   Diagnosis Code    Acute appendicitis with localized peritonitis K35.30    Closed fracture of right hip with routine healing S72.001D    Pain R52    Anticoagulated Z79.01    Bipolar disorder, current episode mixed, moderate (H) F31.62    ABLA (acute blood loss anemia) D62    Primary insomnia F51.01    Urinary retention R33.9    Drug-induced constipation K59.03    Mood disorder F39    Anxiety F41.9    Pyuria R82.81    Depression with anxiety F41.8    Passive suicidal ideations R45.851       Primary Problem This Admission: Active Hospital Problems    Depression with anxiety      Passive suicidal ideations      *Anxiety        Reanna Wynn  MSW Intern

## 2025-04-11 NOTE — ED NOTES
Pt has been resting in her chair for most of the shift. Pt has been feeling more anxious and experiencing more fear about going home. Pt is unable to really identify about why she is afraid but has this intense fear of feeling like she is not getting better. Pt was encouraged to refocus her thoughts  and stay in the moment. Pt encouraged to use breathing techniques and work on mindfulness. Pt was able to meet with provider and discuss some medications changes. Pt is willing to take some Seroquel tonight to help with some anxiety and sleep that she is having in the morning. Pt denies SI at this time. Plan to reassess tomorrow morning to discuss disposition.

## 2025-04-11 NOTE — ED NOTES
Patient continues to report feelings anxious. She states the idea of going home. She did not make any suicidal statement this morning. Patient denies any visual or auditory.

## 2025-04-11 NOTE — PROGRESS NOTES
Pt woke briefly during the NOC due to a peer disrupting. She was able to get back to sleep with no complaints.

## 2025-04-11 NOTE — ED PROVIDER NOTES
"EmPATH Unit - Psychiatric Consultation  [facility] Emergency Department    Sherrie L Swanson Behboudi MRN: [id number]   Age: 65 year old YOB: 1960     Telemedicine Visit: The patient's condition can be safely assessed and treated via synchronous audio and visual telemedicine encounter.      Reason for Telemedicine Visit: Services only offered telehealth      [facility]    Distant Site (Provider Location): Provider Remote Home Office Setting    Consent:  The patient/guardian has verbally consented to: the potential risks and benefits of telemedicine (video visit or phone) versus in person care; bill my insurance or make self-payment for services provided; and responsibility for payment of non-covered services.     Mode of Communication: Canines, a secure HIPAA compliant video platform      Start time:  1845  End time:   1915   History     Chief Complaint   Patient presents with    Mental Health Problem     HPI  Sherrie L Swanson Behboudi is a 65 year old female with history notable for    {Past History:299689}      Review of Systems  {Complete vs limited ROS:723223}    Physical Examination   BP: (!) 177/72  Pulse: 95  Temp: 97.6  F (36.4  C)  Resp: 18  Height: 170.2 cm (5' 7\")  Weight: 75.1 kg (165 lb 8 oz)  SpO2: 99 %    Physical Exam  General: Appears stated age.   Neuro: Alert and fully oriented. Extremities appear to demonstrate normal strength on visual inspection.   Integumentary/Skin: no rash visualized, normal color    Psychiatric Examination   Appearance: { :881817}  Attitude:  { :948936}  Eye Contact:  { :409224}  Mood:  { :595959}  Affect:  { :699489}  Speech:  { :765833}  Psychomotor Behavior:  { :905308}  Thought Process:  { :292105}  Associations:  { :414055}  Thought Content:  { :048727}  Insight:  { :586033}  Judgement:  { :505044}  Oriented to:  { :134440}  Attention Span and Concentration:  { :248149}  Recent and " Remote Memory:  { :718872}  Language: able to name/identify objects without impairment  Fund of Knowledge: intact with awareness of current and past events    ED Course     ED Course as of 04/10/25 1912   Thu Apr 10, 2025   1904 UA with Microscopic reflex to Culture(!)       Labs Ordered and Resulted from Time of ED Arrival to Time of ED Departure   ROUTINE UA WITH MICROSCOPIC REFLEX TO CULTURE - Abnormal       Result Value    Color Urine Light Yellow      Appearance Urine Clear      Glucose Urine Negative      Bilirubin Urine Negative      Ketones Urine 10 (*)     Specific Gravity Urine 1.021      Blood Urine Negative      pH Urine 6.0      Protein Albumin Urine Negative      Urobilinogen Urine Normal      Nitrite Urine Negative      Leukocyte Esterase Urine Small (*)     Mucus Urine Present (*)     RBC Urine 1      WBC Urine 4      Squamous Epithelials Urine <1         Assessments & Plan (with Medical Decision Making)   Patient presenting with . Nursing notes reviewed noting no acute issues.     I have reviewed the assessment completed by the [facility].     Preliminary diagnosis:    ICD-10-CM    1. Passive suicidal ideations  R45.851       2. THOMAS (generalized anxiety disorder)  F41.1       3. Mood disorder  F39       4. Depression with anxiety  F41.8            Treatment Plan:  - Will discontinue escitalopram given palpitations, will change to desvenlafaxine 25 mg daily.  - Continue 15 mg nightly to target ongoing anxiety symptoms.    -Continue Depakote  mg nightly for mood stabilization  -Continue clonidine 0.1 mg 3 times daily as needed for anxiety.  Blood pressure and pulse have been elevated.  Alternatively, may utilize hydroxyzine 25 mg every 6 hours as needed for anxiety. Discussed prioritizing clonidine over hydroxyzine.  -Trazodone 50 mg nightly as needed for sleep. Discussed testing if the Remeron will be enough to influence sleep.  - Recommend a referral for outpatient neuropsychiatric testing to rule  out cognitive impairment  -Patient may benefit from PHP or IOP referral upon discharge  -Urinalysis results reviewed with small amount of leukocyte esterase.  Urine culture from 3/30 urine specimen reviewed and was notable for mixed urogenital thalia, which is not typically treated.  Patient did complete a 5-day course of Keflex. She is asymptomatic.  - Continue observation status, reassess tomorrow     --  MILLIE Alexandre CNP   [facility] EMERGENCY DEPT  EmPATH Unit    "APPENDECTOMY;  Surgeon: Danilo Jennings MD;  Location: [facility] OR     acetaminophen (TYLENOL) 500 MG tablet  albuterol (PROAIR HFA/PROVENTIL HFA/VENTOLIN HFA) 108 (90 Base) MCG/ACT inhaler  Calcium Carb-Cholecalciferol (CALCIUM 600 + D PO)  dicyclomine (BENTYL) 10 MG capsule  divalproex sodium extended-release (DEPAKOTE ER) 500 MG 24 hr tablet  escitalopram (LEXAPRO) 5 MG tablet  hydrOXYzine HCl (ATARAX) 25 MG tablet  methylcellulose (CITRUCEL) 500 MG TABS tablet  mirtazapine (REMERON) 7.5 MG tablet  polyethylene glycol (MIRALAX) 17 g packet  senna-docusate (SENOKOT-S/PERICOLACE) 8.6-50 MG tablet  traZODone (DESYREL) 50 MG tablet  vitamin B-12 (CYANOCOBALAMIN) 1000 MCG tablet  Vitamin D3 (CHOLECALCIFEROL) 25 mcg (1000 units) tablet  aspirin 81 MG EC tablet  folic acid (FOLVITE) 1 MG tablet  gabapentin (NEURONTIN) 400 MG capsule  HYDROmorphone (DILAUDID) 2 MG tablet  vitamin D3 (CHOLECALCIFEROL) 1.25 MG (07401 UT) capsule      Allergies   Allergen Reactions    Dust Mites     Pcn [Penicillins] Unknown     Family History  No family history on file.  Social History   Social History     Tobacco Use    Smoking status: Never    Smokeless tobacco: Never   Substance Use Topics    Alcohol use: Yes    Drug use: Yes     Comment: Medical Cannibis          Review of Systems  A medically appropriate review of systems was performed with pertinent positives and negatives noted in the HPI, and all other systems negative.    Physical Examination     BP (!) 152/74 (BP Location: Right arm, Patient Position: Supine, Cuff Size: Adult Regular)   Pulse 89   Temp 98.1  F (36.7  C) (Oral)   Resp 16   Ht 1.702 m (5' 7\")   Wt 75.1 kg (165 lb 8 oz)   SpO2 98%   BMI 25.92 kg/m      Physical Exam  General: Appears stated age.   Neuro: Alert and fully oriented. Extremities appear to demonstrate normal strength on visual inspection.   Integumentary/Skin: no rash visualized, normal color    Psychiatric Examination   Appearance: awake, " alert  Attitude:  cooperative  Eye Contact:  good  Mood:  anxious  Affect:  mood congruent  Speech:  clear, coherent  Psychomotor Behavior:  no evidence of tardive dyskinesia, dystonia, or tics  Thought Process:  linear  Associations:  no loose associations  Thought Content:  passive suicidal ideation present, no auditory hallucinations present, and no visual hallucinations present  Insight:  fair  Judgement:  fair  Oriented to:  time, person, and place  Attention Span and Concentration:  fair  Recent and Remote Memory:  fair  Language: able to name/identify objects without impairment  Fund of Knowledge: intact with awareness of current and past events    ED Course     ED Course as of 04/10/25 1912   Thu Apr 10, 2025   1904 UA with Microscopic reflex to Culture(!)       Labs Ordered and Resulted from Time of ED Arrival to Time of ED Departure   ROUTINE UA WITH MICROSCOPIC REFLEX TO CULTURE - Abnormal       Result Value    Color Urine Light Yellow      Appearance Urine Clear      Glucose Urine Negative      Bilirubin Urine Negative      Ketones Urine 10 (*)     Specific Gravity Urine 1.021      Blood Urine Negative      pH Urine 6.0      Protein Albumin Urine Negative      Urobilinogen Urine Normal      Nitrite Urine Negative      Leukocyte Esterase Urine Small (*)     Mucus Urine Present (*)     RBC Urine 1      WBC Urine 4      Squamous Epithelials Urine <1         Assessments & Plan (with Medical Decision Making)   Patient presenting with ongoing anxiety and depression. She has fears regarding her health and feeling afraid. There is some concern for possible cognitive decline. Nursing notes reviewed noting no acute issues.     I have reviewed the assessment completed by the [facility].     Preliminary diagnosis:    ICD-10-CM    1. Passive suicidal ideations  R45.851       2. THOMAS (generalized anxiety disorder)  F41.1       3. Mood disorder  F39       4. Depression with anxiety  F41.8            Treatment Plan:  - Will  discontinue escitalopram given palpitations, will change to desvenlafaxine 25 mg daily to target depression and anxiety.  - Continue 15 mg nightly to target ongoing anxiety symptoms.    -Continue Depakote  mg nightly for mood stabilization  -Continue clonidine 0.1 mg 3 times daily as needed for anxiety.  Blood pressure and pulse have been elevated.  Alternatively, may utilize hydroxyzine 25 mg every 6 hours as needed for anxiety. Discussed prioritizing clonidine over hydroxyzine.  -Seroquel 25-50 mg for increased anxiety, agitation and insomnia.  - Recommend a referral for outpatient neuropsychiatric testing to rule out cognitive impairment  -Patient may benefit from PHP or IOP referral upon discharge  -Urinalysis results reviewed with small amount of leukocyte esterase.  Urine culture from 3/30 urine specimen reviewed and was notable for mixed urogenital thalia, which is not typically treated.  Patient did complete a 5-day course of Keflex. She is asymptomatic.  - Continue observation status, reassess tomorrow     --  MILLIE Alexandre CNP   [facility] EMERGENCY DEPT  EmPATH Unit      Radha Recinos APRN CNP  04/11/25 5830

## 2025-04-11 NOTE — ED PROVIDER NOTES
EmPATH Unit - Psychiatric Observation Discharge Summary  [facility] Emergency Department  Discharge Date: 4/11/2025    Sherrie L Swanson Behboudi MRN: [id number]   Age: 65 year old YOB: 1960     Brief HPI & Initial ED Course     Chief Complaint   Patient presents with    Mental Health Problem     HPI  Sherrie L Swanson Behboudi is a 65 year old female with history notable for a mood disorder with a differential diagnosis of bipolar disorder versus major depressive disorder, with prominent anxiety, complicated by grieving the loss of her  and life changes that have followed.  There was also suspicion for a urinary tract infection recently which was treated with a course of antibiotics.  She is currently under observation status on the EmPATH unit, now approaching 72 hours in the emergency department.  It is my first interaction with her during this visit however I had met with the patient during a prior visit to the unit.  Pertaining to this visit, documentation indicates a recent change in antidepressant medication from Lexapro to Pristiq.  Seroquel is also being utilized as needed for anxiety.  Overnight, nursing staff note no acute issues.  On reassessment today, the patient was sitting in her recliner and watching television.  She accompanied me to the interview room for our meeting.  She reports ongoing anxiety contributing to a sense of feeling unwell.  Mood is depressed and characterized as moderate in intensity.  Energy is low.  Sleep was poor last night.  Concentration is limited.  She did not complain of dysuria or other urinary symptoms.  There was no indication of psychosis, suicidal, or homicidal thoughts.  Heightened anxiety as her primary symptom of concern.  She adds that her brother-in-law was recently hospitalized following a stroke or heart attack, which has further added to her emotional burden and anxiety.  She is looking forward to establishing with an intensive outpatient  "program.        Physical Examination   BP: (!) 165/91  Pulse: 84  Temp: 98  F (36.7  C)  Resp: 18  Height: 170.2 cm (5' 7\")  Weight: 75.1 kg (165 lb 8 oz)  SpO2: 99 %    Physical Exam  General: Appears stated age.   Neuro: Alert and fully oriented. Extremities appear to demonstrate normal strength on visual inspection.   Integumentary/Skin: no rash visualized, normal color    Psychiatric Examination   Appearance: awake, alert  Attitude:  cooperative  Eye Contact:  fair  Mood:  anxious  Affect:  mood congruent  Speech:  increased speech latency and decreased prosody  Psychomotor Behavior:  no evidence of tardive dyskinesia, dystonia, or tics  Thought Process:  linear  Associations:  no loose associations  Thought Content:  no evidence of suicidal ideation or homicidal ideation and no evidence of psychotic thought  Insight:  fair  Judgement:  fair    Results     ED Course as of 04/11/25 1425   Thu Apr 10, 2025   1904 UA with Microscopic reflex to Culture(!)       Labs Ordered and Resulted from Time of ED Arrival to Time of ED Departure   ROUTINE UA WITH MICROSCOPIC REFLEX TO CULTURE - Abnormal       Result Value    Color Urine Light Yellow      Appearance Urine Clear      Glucose Urine Negative      Bilirubin Urine Negative      Ketones Urine 10 (*)     Specific Gravity Urine 1.021      Blood Urine Negative      pH Urine 6.0      Protein Albumin Urine Negative      Urobilinogen Urine Normal      Nitrite Urine Negative      Leukocyte Esterase Urine Small (*)     Mucus Urine Present (*)     RBC Urine 1      WBC Urine 4      Squamous Epithelials Urine <1     URINE CULTURE       Observation Course   The patient was found to have a psychiatric condition that would benefit from an observation stay in the emergency department for further psychiatric stabilization and/or coordination of a safe disposition. The plan upon observation admission included serial assessments of psychiatric condition, potential administration of " medications if indicated, further disposition pending the patient's psychiatric course during the monitoring period.     Serial assessments of the patient's psychiatric condition were performed. Nursing notes were reviewed. During the observation period, the patient did not require medications for agitation, and did not require restraints/seclusion for patient and/or provider safety.     After a period of working with the treatment team on the EmPATH unit, the patient's mental state improved to allow a safe transition to outpatient care. After counseling on the diagnosis, work-up, and treatment plan, the patient was discharged. Close follow-up with a psychiatrist and/or therapist was recommended and community psychiatric resources were provided. Patient is to return to the ED if any urgent or potentially life-threatening concerns.     Discharge Diagnoses:   Final diagnoses:   Passive suicidal ideations   THOMAS (generalized anxiety disorder)   Mood disorder (major depressive disorder versus bipolar disorder)          Treatment Plan:  -Continue Pristiq 25 mg daily, noting the recent addition of this medication which has replaced Lexapro for antianxiety and antidepressant treatment.  The dose is currently low however given her age, it is reasonable to continue this dose and monitor her response and tolerability in the upcoming weeks.  -Continue Remeron 15 mg nightly for augmentation of Pristiq with additional benefits at addressing insomnia.  -Continue Seroquel 12.5 mg - 25 mg as needed for anxiety and/or insomnia (reserving the higher dose for nighttime use).  -Urinalysis was reviewed and slightly improved since her last urinalysis.  Given the recent altered mental state and treatment course involving antibiotics, I will request a urine culture to ensure adequate response to the antibiotic treatment.  Her last culture results were reviewed and consistent of urogenital thalia.  -Referral for intensive outpatient  programming  -Outpatient mental health treatment including psychiatric medication management and psychotherapy  -Discharge home today      At the time of discharge, the patient's acute suicide risk was determined to be low due to the following factors: Reduction in the intensity of mood/anxiety symptoms that preceded the admission, denial of suicidal thoughts, denies feeling helpless or hopeless, not currently under the influence of alcohol or illicit substances, denies experiencing command hallucinations, no immediate access to firearms. The patient's acute risk could be higher if noncompliant with their treatment plan, medications, follow-up appointments or using illicit substances or alcohol. Protective factors include: social supports, children, stable housing    I spent more than 30 minutes on discharge day activities.    --  Terrence Lopez MD  [facility] EMERGENCY DEPT  EmPATH Unit       Terrence Lopez MD  04/11/25 9503

## 2025-04-11 NOTE — PROGRESS NOTES
Discharge instructions reviewed with patient and patient's daughter Lopez, including follow-up care plan. Reviewed safety plan and outpatient resources, and medications. All belongings that were brought into the hospital have been returned to patient. Escorted off the unit at 1735 accompanied by [facility] staff. Discharged to home via ride from daughter.

## 2025-04-11 NOTE — ED NOTES
Patient found out this morning that her late 's brother is on life support. Patient became visibly anxious and tearful. She was offered PRN Hydroxyzine and some essential oils.

## 2025-04-11 NOTE — DISCHARGE INSTRUCTIONS
Patient Navigation Hub - Scheduled Appointment  You have been scheduled a telephone appointment with the Mental Health and Addiction Services Patient Navigation Hub. As a reminder, this is not an in-person appointment. A Navigator will contact you at your personal telephone number on Monday 4/14/2025 at 8:00 AM. You can expect a 15-30 minute appointment. You will discuss programming options and be assisted in next steps. If you have any further questions or concerns, please contact the Patient Navigation Hub at [phone]. Note: You will not be charged for this telephone appointment.    Our Navigators work to be your point-of-contact for trustworthy and compassionate care from Emergency Services to [facility] s Programmatic Care. We will provide resources and communication to help guide you into programmatic care, other internal resources (I.e., Transition Clinic), and/or community programs. Ultimately, our goal is to be the one-stop-shop of communication, coordination, and support for you.    Phone: [phone]  Email: [email or web address]  Fax: [phone]    [facility] Programmatic Care  The following is a list of our programming options that may fit your next steps:    Programs  Mental Health  Intensive Outpatient Program (IOP)  Partial Hospitalization Program (PHP)  Day Treatment  Substance Use Disorder  Intensive Outpatient Program  Outpatient Group  Mental Health & Substance Use Disorder  Co-Occurring Intensive Outpatient Program  Residential  Available Locations  [facility], [place], [place], [place]  Note: Specific program options vary by location.    Transition Clinic  After leaving Emergency Services, it may take up to 30 days to enter a program. Knowing support is important during this period of time, we offer an urgent model of mental health care via the Transition Clinic. We encourage you to discuss this with your  Navigator during your telephone appointment.    FAQ  What can I expect after leaving Emergency Services?  If not already, you will soon be contacted by a Navigator who will work with you to find a program that fits your needs. If you desire to enter one of our [facility] programs, you will enter our programmatic care intake where an assessment will likely be needed over telephone, virtually, or in-person. After this assessment, a Navigator will connect with you again to provide a handoff to the specific program for next steps.   Please note that it may take up to 30 days for you to begin a program. If an extended wait occurs, please consider services at the Transition Clinic.  What is programmatic care?  It is a highly structured and comprehensive approach designed to address mental health and substance use disorders.   Programmatic care is typically used when individuals have not responded well to less intensive treatments or when they require a higher level of intervention due to the severity of their condition. It can be found in various settings, such as an outpatient location, residential treatment facilities, or inpatient hospitals.  Each program varies in duration and weekly commitments. Ask a Navigator for more program details.

## 2025-04-13 LAB — BACTERIA UR CULT: NORMAL

## 2025-04-14 ENCOUNTER — PATIENT OUTREACH (OUTPATIENT)
Dept: CARE COORDINATION | Facility: CLINIC | Age: 65
End: 2025-04-14

## 2025-04-14 NOTE — PROGRESS NOTES
Connected Care Resource Center: [facility]    Background: Transitional Care Management program identified per system criteria and reviewed by [facility] team for possible outreach.    Assessment: Upon chart review, CCR Team member will not proceed with patient outreach related to this episode of Transitional Care Management program due to reason below:    Patient has active communication with a nurse, provider or care team for reason of post-hospital follow up plan.  Outreach call by [facility] team not indicated to minimize duplicative efforts.     Plan: Transitional Care Management episode addressed appropriately per reason noted above.      DIMAS Kirby  [facility] [place], [facility]    *Connected Care Resource Team does NOT follow patient ongoing. Referrals are identified based on internal discharge reports and the outreach is to ensure patient has an understanding of their discharge instructions.

## 2025-04-16 ENCOUNTER — VIRTUAL VISIT (OUTPATIENT)
Dept: PSYCHOLOGY | Facility: CLINIC | Age: 65
End: 2025-04-16

## 2025-04-16 DIAGNOSIS — F31.4 BIPOLAR 1 DISORDER, DEPRESSED, SEVERE (H): Primary | ICD-10-CM

## 2025-04-16 PROCEDURE — 90791 PSYCH DIAGNOSTIC EVALUATION: CPT | Mod: 95 | Performed by: SOCIAL WORKER

## 2025-04-16 ASSESSMENT — ANXIETY QUESTIONNAIRES
2. NOT BEING ABLE TO STOP OR CONTROL WORRYING: NEARLY EVERY DAY
6. BECOMING EASILY ANNOYED OR IRRITABLE: NEARLY EVERY DAY
1. FEELING NERVOUS, ANXIOUS, OR ON EDGE: NEARLY EVERY DAY
IF YOU CHECKED OFF ANY PROBLEMS ON THIS QUESTIONNAIRE, HOW DIFFICULT HAVE THESE PROBLEMS MADE IT FOR YOU TO DO YOUR WORK, TAKE CARE OF THINGS AT HOME, OR GET ALONG WITH OTHER PEOPLE: EXTREMELY DIFFICULT
8. IF YOU CHECKED OFF ANY PROBLEMS, HOW DIFFICULT HAVE THESE MADE IT FOR YOU TO DO YOUR WORK, TAKE CARE OF THINGS AT HOME, OR GET ALONG WITH OTHER PEOPLE?: EXTREMELY DIFFICULT
GAD7 TOTAL SCORE: 21
5. BEING SO RESTLESS THAT IT IS HARD TO SIT STILL: NEARLY EVERY DAY
3. WORRYING TOO MUCH ABOUT DIFFERENT THINGS: NEARLY EVERY DAY
7. FEELING AFRAID AS IF SOMETHING AWFUL MIGHT HAPPEN: NEARLY EVERY DAY
7. FEELING AFRAID AS IF SOMETHING AWFUL MIGHT HAPPEN: NEARLY EVERY DAY
GAD7 TOTAL SCORE: 21
GAD7 TOTAL SCORE: 21
4. TROUBLE RELAXING: NEARLY EVERY DAY

## 2025-04-16 ASSESSMENT — PATIENT HEALTH QUESTIONNAIRE - PHQ9
SUM OF ALL RESPONSES TO PHQ QUESTIONS 1-9: 22
SUM OF ALL RESPONSES TO PHQ QUESTIONS 1-9: 22
10. IF YOU CHECKED OFF ANY PROBLEMS, HOW DIFFICULT HAVE THESE PROBLEMS MADE IT FOR YOU TO DO YOUR WORK, TAKE CARE OF THINGS AT HOME, OR GET ALONG WITH OTHER PEOPLE: EXTREMELY DIFFICULT

## 2025-04-16 NOTE — PROGRESS NOTES
"    [facility]     PATIENT'S NAME: Sherrie L Swanson Behboudi  PREFERRED NAME: Eveline  PRONOUNS:   she/her/hers    MRN: [id number]  : 1960  ADDRESS: [address] [place] [address]  [id number]  DATE OF SERVICE: 25  START TIME: 11:30AM  END TIME: 12:35PM  PREFERRED PHONE: [phone]  May we leave a program related message: Yes  EMERGENCY CONTACT: was obtained - pt's daughter, Meishon Behboudi.  SERVICE MODALITY:  Video Visit:      Provider verified identity through the following two step process.  Patient provided:  Patient  and Patient address    Telemedicine Visit: The patient's condition can be safely assessed and treated via synchronous audio and visual telemedicine encounter.      Reason for Telemedicine Visit: Services only offered telehealth    Originating Site (Patient Location): Patient's home    Distant Site (Provider Location): Provider Remote Setting- Home Office    Consent:  The patient/guardian has verbally consented to: the potential risks and benefits of telemedicine (video visit) versus in person care; bill my insurance or make self-payment for services provided; and responsibility for payment of non-covered services.     Patient would like the video invitation sent by:  My Chart    Mode of Communication:  Video Conference via [facility]    Distant Location (Provider):  Off-site    As the provider I attest to compliance with applicable laws and regulations related to telemedicine.    UNIVERSAL ADULT Mental Health DIAGNOSTIC ASSESSMENT    Identifying Information:  Patient is a 65 year old  individual who identifies as female and use pronouns she/her/hers.  Patient was referred for an assessment by [facility].  Patient attended the session alone. Pt did share that her daughter, Lopez, was in a nearby room for emotional support if needed.    Chief Complaint:   The reason for seeking services at this time is: \"I " "have been so overwhelmed with anxiety; I have been struggling to function throughout my days for the past few weeks\". The problem(s) began 3/13/2025. Pt reports constant worry that she is unable to control/regulate, anxiety/panic attacks daily, poor sleep, loss of appetite, \"constant fear of the world around me\", and an overall decline in her daily functioning. Pt's anxiety attacks have caused several ED visits over the past few weeks due to pt's physical experience of anxiety - heart palpitations, labored breathing, dizziness, and confusion in the moment. All EKGs and labs have come back normal for pt during these visits. Pt is seeking a higher level of care at this time. Patient has attempted to resolve these concerns in the past through psychiatry, OP therapy, and mental health hospitalization .    Social/Family History:  Patient was born in  [place]  and raised in  the areas of [place], [place] and [place], [place] .  Patient did not move during childhood. They were raised by biological parents.  Parents stayed . Pt has (3) siblings - (1) brother and (2) sisters. One sister has  due to cancer. Patient reported that their childhood was \"good and loving\", and also shared that her mother struggled with Bipolar disorder and needed to be hospitalized at times. Pt reported no significant losses, trauma, or abuse in childhood.    The patient describes their cultural background as , [place].  Cultural influences and impact on patient's life structure, values, norms, and healthcare: none reported.  Contextual influences on patient's health include: none reported.  Cultural, Contextual, and socioeconomic factors do not affect the patient's access to services.  These factors will be addressed in the Preliminary Treatment plan.  Patient identified their preferred language to be English. Patient reported they do not need the assistance of an  or other support involved in therapy.     Patient reported had " "no significant delays in developmental tasks. Patient's highest education level was college graduate. Patient identified the following learning problems: none reported.  Modifications will not be used to assist communication in therapy. Patient reports they are able to understand written materials.    Patient's current relationship status is  for 6 years - pt's ex   of a heart attack in pt's presence. At the time, pt and her ex  were , but still lived together. Pt described his death as \"traumatic\".  Patient identified their sexual orientation as heterosexual.  Patient reported having two child(maciel) - her daughter Lopez (age 32) and her daughter Lizett, age 29. Patient identified siblings, adult child, pets, and friends as part of their support system.  Patient identified the quality of these relationships as stable and meaningful.     Patient's current living/housing situation involves staying with her daughterLopez and pt reports that housing is stable .    Patient is currently unemployed. 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  Patient reports their finances are obtained through  children .  Patient does identify finances as a current stressor.      Patient reported that they have not been involved with the legal system. Patient denies being on probation / parole / under the jurisdiction of the court.    Patient's Strengths and Limitations:  Patient identified the following strengths or resources that will help them succeed in treatment: commitment to health and well being, friends / good social support, family support, insight, and intelligence. Things that may interfere with the patient's success in treatment include: none identified.     Assessments:  The following assessments were completed by patient for this visit:  PHQ9:       2025     9:39 AM   PHQ-9 SCORE   PHQ-9 Total Score MyChart 22 (Severe depression)   PHQ-9 " Total Score 22        Patient-reported     GAD7:       4/16/2025     9:51 AM   THOMAS-7 SCORE   Total Score 21 (severe anxiety)   Total Score 21        Patient-reported     CAGE-AID:       4/16/2025     9:54 AM   CAGE-AID Total Score   Total Score 0    Total Score MyChart 0 (A total score of 2 or greater is considered clinically significant)       Patient-reported     PROMIS 10-Global Health (only subscores and total score):       4/16/2025     9:54 AM   PROMIS-10 Scores Only   Global Mental Health Score 4    Global Physical Health Score 18    PROMIS TOTAL - SUBSCORES 22        Patient-reported     [place] Suicide Severity Rating Scale (Short Version)      3/31/2025     9:17 AM 4/8/2025     3:19 PM 4/8/2025     7:50 PM 4/8/2025     7:53 PM 4/10/2025     3:40 PM 4/11/2025    10:34 AM 4/11/2025    12:00 PM   [place] Suicide Severity Rating (Short Version)   Q1 Wished to be Dead (Past Month)  1-->yes  0-->no   0-->no   Q2 Suicidal Thoughts (Past Month)  1-->yes  0-->no   0-->no   Q3 Suicidal Thought Method  0-->no  0-->no   0-->no   Q4 Suicidal Intent without Specific Plan  1-->yes  0-->no   0-->no   Q5 Suicide Intent with Specific Plan  0-->no  0-->no   0-->no   Q6 Suicide Behavior (Lifetime)  0-->no 0-->no    0-->no   Level of Risk per Screen  high risk  no risks indicated   no risks indicated   1. Wish to be Dead (Since Last Contact) N    Y Y    2. Non-Specific Active Suicidal Thoughts (Since Last Contact) N    N N    Most Severe Ideation Rating (Since Last Contact)      1    Frequency (Since Last Contact)      3    Duration (Since Last Contact)      1    Deterrents (Since Last Contact)      0    Reasons for Ideation (Since Last Contact)      0    Actual Attempt (Since Last Contact) N    N N    Has subject engaged in non-suicidal self-injurious behavior? (Since Last Contact) N    N N    Interrupted Attempts (Since Last Contact) N    N N    Aborted or Self-Interrupted Attempt (Since Last Contact) N    N N   "  Preparatory Acts or Behavior (Since Last Contact) N    N N    Suicide (Since Last Contact) N    N N    Calculated C-SSRS Risk Score (Since Last Contact) No Risk Indicated    Low Risk Low Risk        Personal and Family Medical History:  Patient does report a family history of mental health concerns.  Patient reports Bipolar disorder in her mother, and depression in her father. Pt's father  of melanoma when the pt was in her twenties.     Patient does report Mental Health Diagnosis and/or Treatment.  Pt reported the following previous diagnoses which include(s): an Anxiety Disorder and a Bipolar Disorder.  Patient reported symptoms began as an adult - early twenties - \"around the same time my father was diagnosed with cancer\". Patient has received mental health services in the past:  psychiatry, OP therapy, and mental health hospitalization .  Psychiatric Hospitalizations: (1) - pt could not recall the location, but remembers the year of .  Patient denies a history of civil commitment.  Patient is receiving other mental health services.  This includes OP therapy & psychiatry.    Patient has had a physical exam to rule out medical causes for current symptoms.  Date of last physical exam was within the past year. Client was encouraged to follow up with PCP if symptoms were to develop. The patient has a non-[facility] Primary Care Provider. Their PCP is FATOU Santizo.  Patient reports no current medical concerns.  Patient denies any issues with pain..   There are significant appetite / nutritional concerns / weight changes. These may include: loss of appetite and low appetite. Patient reports the following sleep concerns: \"interrupted sleep that never feels restful; I always wake up exhausted\".   Patient does not report a history of head injury/trauma/cognitive impairment.     Patient reports current meds as:   Current Outpatient Medications   Medication Sig Dispense Refill    " acetaminophen (TYLENOL) 500 MG tablet Take 1,000 mg by mouth 3 times daily as needed for mild pain.      albuterol (PROAIR HFA/PROVENTIL HFA/VENTOLIN HFA) 108 (90 Base) MCG/ACT inhaler Inhale 2 puffs into the lungs every 4 hours as needed for shortness of breath, wheezing or cough      aspirin 81 MG EC tablet Take 162 mg by mouth daily Complete on 8/18      Calcium Carb-Cholecalciferol (CALCIUM 600 + D PO) Take 600 mg by mouth 2 times daily      desvenlafaxine (PRISTIQ) 50 MG 24 hr tablet Take 1 tablet (50 mg) by mouth daily. 15 tablet 1    dicyclomine (BENTYL) 10 MG capsule Take 10 mg by mouth 3 times daily as needed (GI).      divalproex sodium extended-release (DEPAKOTE ER) 500 MG 24 hr tablet Take 500 mg by mouth at bedtime.      escitalopram (LEXAPRO) 5 MG tablet Take 1 tablet (5 mg) by mouth daily. 30 tablet 0    folic acid (FOLVITE) 1 MG tablet Take 1 mg by mouth daily      gabapentin (NEURONTIN) 400 MG capsule Take 400 mg by mouth At Bedtime      HYDROmorphone (DILAUDID) 2 MG tablet Take 1 tablet (2 mg) by mouth every 6 hours as needed for breakthrough pain 10 tablet 0    hydrOXYzine HCl (ATARAX) 25 MG tablet Take 25 mg by mouth 2 times daily as needed for anxiety.      methylcellulose (CITRUCEL) 500 MG TABS tablet Take 1,000 mg by mouth 2 times daily.      mirtazapine (REMERON) 7.5 MG tablet Take 1 tablet (7.5 mg) by mouth at bedtime. 30 tablet 0    polyethylene glycol (MIRALAX) 17 g packet Take 17 g by mouth 2 times daily      QUEtiapine (SEROQUEL) 25 MG tablet Take 0.5-1 tablets (12.5-25 mg) by mouth 2 times daily as needed (severe anxiety or insomnia). 15 tablet 0    senna-docusate (SENOKOT-S/PERICOLACE) 8.6-50 MG tablet Take 2 tablets by mouth 2 times daily      traZODone (DESYREL) 50 MG tablet Take 50 mg by mouth At Bedtime      vitamin B-12 (CYANOCOBALAMIN) 1000 MCG tablet Take 1,000 mcg by mouth daily      vitamin D3 (CHOLECALCIFEROL) 1.25 MG (03709 UT) capsule Take 50,000 Units by mouth every 7 days       Vitamin D3 (CHOLECALCIFEROL) 25 mcg (1000 units) tablet Take 1 tablet by mouth daily       No current facility-administered medications for this visit.       Medication Adherence:  Patient reports taking psychiatric medications as prescribed.    Patient Allergies:    Allergies   Allergen Reactions    Dust Mites     Pcn [Penicillins] Unknown       Medical History:    Past Medical History:   Diagnosis Date    Bipolar I disorder (H)     Chronic nausea     Depression with anxiety     Hyperlipidemia     Medical cannabis use          Current Mental Status Exam:   Appearance:  Appropriate    Eye Contact:  Good   Psychomotor:  Normal       Gait / station:  not observed  Attitude / Demeanor: Cooperative   Speech      Rate / Production: Normal/ Responsive      Volume:  Normal       Language:  intact  Mood:   Depressed   Affect:   Flat    Thought Content: Clear   Thought Process: Coherent       Associations: No loosening of associations  Insight:   Fair   Judgment:  Intact   Orientation:  Person Place Time Situation  Attention/concentration: Good    Substance Use:   Patient did not report a family history of substance use concerns; see medical history section for details.  Patient has not received chemical dependency treatment in the past.  Patient has not ever been to detox.      Patient is not currently receiving any chemical dependency treatment. Patient reported the following problems as a result of their substance use:  none.    Patient reports occasional cannabis and alcohol use. Pt has stopped using cannabis since 2/2025 as she feels it contributes to her anxiety. Pt also uses caffeine 1-2 times per day.    Based on the CAGE score of 0 and clinical interview there are not indications of drug or alcohol abuse.    Significant Losses / Trauma / Abuse / Neglect Issues:   Patient did not serve in the .  There are indications or report of significant loss, trauma, abuse or neglect issues related to: death of her  ex- in her presence due to heart attack .  Patient has not been a victim of exploitation.  Concerns for possible neglect are not present.    Safety Assessment:   Patient denies current or past homicidal ideation and behaviors.  Patient denies current or past suicidal ideation and behaviors.  Patient denies current or past self-injurious behaviors.  Patient denied risk behaviors associated with substance use.  Patient denies any high risk behaviors associated with mental health symptoms.  Patient denied current or past personal safety concerns.    Patient denies past of current/recent assaultive behaviors.    Patient denied a history of sexual assault behaviors.     Patient reports there are not firearms in the house.    Patient reports the following protective factors: dedication to family or friends; safe and stable environment; effectively controls impulses; purpose; secure attachment; abstinence from substances; adherence with prescribed medication; living with other people; effective problem solving skills; commitment to well being; healthy fear of risky behaviors or pain; access to a variety of clinical interventions and pets    Risk Plan:  See Recommendations for Safety and Risk Management Plan    Review of Symptoms per patient report:   Depression: Lack of interest or pleasure in doing things, Feeling sad, down, or depressed, Feelings of hopelessness, Change in energy level, Change in sleep, Change in appetite, Difficulties concentrating, Excessive or inappropriate guilt, Feelings of helplessness, Ruminations, and Withdrawn  Petra:  No Symptoms  Psychosis: No Symptoms  Anxiety: Excessive worry, Nervousness, Separation anxiety, Social anxiety, Sleep disturbance, Ruminations, and Poor concentration  Panic:  Palpitations, Shortness of breath, Tremors, and dizziness  Post Traumatic Stress Disorder:  No Symptoms   Eating Disorder: No Symptoms  ADD / ADHD:  No symptoms  Conduct Disorder: No symptoms  Autism  Spectrum Disorder: No symptoms  Obsessive Compulsive Disorder: No Symptoms  Personality Disorders:  No Symptoms    Patient reports the following compulsive behaviors and treatment history: None.      Diagnostic Criteria:   Generalized Anxiety Disorder  A. Excessive anxiety and worry about a number of events or activities (such as work or school performance).   B. The person finds it difficult to control the worry.  C. Select 3 or more symptoms (required for diagnosis). Only one item is required in children.   - Restlessness or feeling keyed up or on edge.    - Being easily fatigued.    - Difficulty concentrating or mind going blank.    - Irritability.    - Sleep disturbance (difficulty falling or staying asleep, or restless unsatisfying sleep).   D. The focus of the anxiety and worry is not confined to features of an Axis I disorder.  E. The anxiety, worry, or physical symptoms cause clinically significant distress or impairment in social, occupational, or other important areas of functioning.   F. The disturbance is not due to the direct physiological effects of a substance (e.g., a drug of abuse, a medication) or a general medical condition (e.g., hyperthyroidism) and does not occur exclusively during a Mood Disorder, a Psychotic Disorder, or a Pervasive Developmental Disorder.    - The aformentioned symptoms began 2 year(s) ago and occurs 7 days per week and is experienced as moderate to severe. Bipolar I Hypomanic Episode  HYPOMANIC EPISODE - At least one lifetime manic episode is required for the dx of Bipolar I Disorder  A. A distinct period of abnormally and persistently elevated, expansive, or irritable mood, lasting at least 1 week (or any duration if hospitalization is necessary).   B. During the period of mood disturbance, three (or more) of the following symptoms (four if the mood is only irritable) have persisted and have been present to a significant degree:   - inflated self-esteem or grandiosity    -  decreased need for sleep (e.g., feels rested after only 3 hours of sleep)    - more talkative than usual or pressure to keep talking    - flight of ideas or subjectivie experience that thoughts are racing   - distractibility   - increase in goal-directed activity  C. The episode is associated with an unequivocal change in functioning that is uncharateristic of the individual when not symptomatic  D. The disturbance of mood and the change in fuctioning are overservable by others  D. The symptoms are not attributable to the physiologicial effects of a substance or to another medical condition  E. The episode is not sufficiently severe enough to cause marked impairment in social or occupational functioning or to necessitate hospitalization.  If there are psychotic features, the episode is by definition manic  F. The symptoms are not due to the direct physiological effects of a substance (eg, a drug of abuse, a medication, or other treatment) or a general medical condition (eg, hyperthyroidism).    Functional Status:  Patient reports the following functional impairments:  health maintenance, management of the household and or completion of tasks, money management, operation of a motor vehicle, organization, relationship(s), self-care, social interactions, and work / vocational responsibilities.     Adult  Programmatic care:  Current LOCUS was assigned and patient needs the following level of care based on score 22.      1. Does the patient have a history of vulnerability such as being teased, picked on, or other indications of potential safety issues with other residents?  No    2. Does this patient have a history of being the victim of abuse? No history of abuse reported or documented.    3. Does this patient have a history of victimizing others? No     4. Does the patient have a history of boundary violations?  No.    5. Does the patient have a history of other sexual acting out behaviors (e.g grooming)?   No    6.  Does the patient have a history of threats to self or others? Fire setting, running away or other self-injurious behaviors?    No    7. Does the patient s history indicate the need for special precautions or particular staffing patterns in the facility?  No      Clinical Summary:  1. Psychosocial Factors:  Trauma history, Occupational Issues, Limited social supports, Grief/loss.  Cultural and Contextual Factors: N/A  2. Principal DSM5 Diagnoses  (Sustained by DSM5 Criteria Listed Above):   296.53 Bipolar I Disorder Current or Most Recent Episode Depressed, Severe.  3. Other Diagnoses that is relevant to services:   300.02 (F41.1) Generalized Anxiety Disorder.  4. Provisional Diagnosis: None.  5. Prognosis: Expect Improvement and Relieve Acute Symptoms.  6. Likely consequences of symptoms if not treated: Pt may require mental health hospitalization due to severe decrease in pt's global functioning.  7. Patient strengths include:  has a previous history of therapy, insightful, open to learning, support of family, friends and providers, and wants to learn .     Recommendations:     1. Plan for Safety and Risk Management:   Safety and Risk: A safety and risk management plan has been developed including: Patient consented to co-developed safety plan.  Safety and risk management plan was completed - see below.  Patient agreed to use safety plan should any safety concerns arise.  A copy was given to the patient.        Report to child / adult protection services was NA.     2. Patient's identified no barriers to care at the time of this assessment.     3. Initial Treatment will focus on:    Depressed Mood - mood stability, coping skills/tools, group experience.  Anxiety - emotional regulation, grounding, mindfulness .     4. Resources/Service Plan:    services are not indicated.   Modifications to assist communication are not indicated.   Additional disability accommodations are not indicated.      5.  Collaboration:   Collaboration / coordination of treatment will be initiated with the following  support professionals: None at this time.      6.  Referrals:   The following referral(s) will be initiated: Partial Hospitalization Program.       A Release of Information has been obtained for the following: N/A.     Clinical Substantiation/medical necessity for the above recommendations:  Bipolar I, THOMAS, severe decrease in functioning.    7. COLLEEN:    COLLEEN:  Discussed the general effects of drugs and alcohol on health and well-being. No COLLEEN concerns at this time.    8. Records:   These were reviewed at time of assessment.   Information in this assessment was obtained from the medical record and  provided by patient who is a good historian. Patient will have open access to their mental health medical record.    9.   Interactive Complexity: No    10. Safety Plan:   [place] Safety Plan      Creation Date: 3/31/25 Last Update Date: 4/11/25      Step 1: Warning signs:    Warning Signs    Increase in anxiety    Insomnia    Lack of concentration    Racing heart      Step 2: Internal coping strategies - Things I can do to take my mind off my problems without contacting another person:    Strategies    Walk my dog    Watch Crime Shows    Use an ice pack on my face/neck      Step 3: People and social settings that provide distraction:    Name Contact Information    Sunny (Daughter) [phone]    Lizett (Daughter) See Phone       Places    Going for a walk    [employer]      Step 4: People whom I can ask for help during a crisis:    Name Contact Information    Sunny (Daughter) [phone]      Step 5: Professionals or agencies I can contact during a crisis:    Clinician/Agency Name Phone Emergency Contact    COPE Crisis Line [phone]        Local Emergency Department Emergency Department Address Emergency Department Phone    [place] [address] [address] [address] [phone]    91         Suicide Prevention Lifeline Phone: Call or Text 579  Crisis Text Line: Text HOME to 206691     Step 6: Making the environment safer (plan for lethal means safety):   Did not identify any lethal methods     Optional: What is most important to me and worth living for?:      Kate Safety Plan. Daphney Bermudez and Gary Plunkett. Used with permission of the authors.          Provider Name/ Credentials:  Afia Snowden MSW, LICSW  April 16, 2025

## 2025-04-16 NOTE — PROGRESS NOTES
LOCUS Worksheet     Name: Sherrie L Swanson Behboudi MRN: [id number]    : 1960      Gender:  female    PMI:  Medicare (pending)   Provider Name: Afia MUSTAFA [facility]   Provider NPI:  [id number]    Actual level of Care Provided:  OP therapy    Service(s) receiving or referred to:  Adult PHP @ [facility]    Reason for Variance: Bipolar 2, THOMAS       Rating completed by: Afia MUSTAFA [facility]      I. Risk of Harm:   3      Moderate Risk of Harm    II. Functional Status:   4      Serious Impairment    III. Co-Morbidity:   3      Significant Co-Morbidity    IV - A. Recovery Environment - Level of Stress:   3      Moderately Stress Environment    IV - B. Recovery Environment - Level of Support:   3      Limited Support in Environment    V. Treatment and Recovery History:   3      Moderate to Equivocal Response to Treatment and Recovery Management    VI. Engagement and Recovery Project:   3      Limited Engagement and Recovery       22 Composite Score    Level of Care Recommendation:   20 to 22       Medically Monitored Non-Residential Services

## (undated) DEVICE — ENDO TROCAR FIRST ENTRY KII FIOS Z-THRD 12X100MM CTF73

## (undated) DEVICE — GLOVE PROTEXIS BLUE W/NEU-THERA 7.5  2D73EB75

## (undated) DEVICE — SUCTION IRR STRYKERFLOW II W/TIP 250-070-520

## (undated) DEVICE — ENDO TROCAR FIRST ENTRY KII FIOS Z-THRD 05X100MM CTF03

## (undated) DEVICE — STPL RELOAD REG TISSUE ECHELON 45 X 3.6MM BLUE GST45B

## (undated) DEVICE — ENDO POUCH UNIV RETRIEVAL SYSTEM INZII 10MM CD001

## (undated) DEVICE — SU VICRYL 4-0 PS-2 18" UND J496H

## (undated) DEVICE — PACK LAP CHOLE SLC15LCFSD

## (undated) DEVICE — SU VICRYL 0 UR-6 27" J603H

## (undated) DEVICE — STPL POWERED ECHELON 45MM PSEE45A

## (undated) DEVICE — SOL WATER IRRIG 1000ML BOTTLE 2F7114

## (undated) DEVICE — PREP CHLORAPREP 26ML TINTED ORANGE  260815

## (undated) DEVICE — ESU LIGASURE LAPAROSCOPIC BLUNT TIP SEALER 5MMX37CM LF1837

## (undated) DEVICE — ESU GROUND PAD UNIVERSAL W/O CORD

## (undated) DEVICE — SOL NACL 0.9% INJ 1000ML BAG 2B1324X

## (undated) DEVICE — SUCTION CANISTER MEDIVAC LINER 3000ML W/LID 65651-530

## (undated) DEVICE — GLOVE PROTEXIS W/NEU-THERA 7.5  2D73TE75

## (undated) DEVICE — ENDO TROCAR SLEEVE KII Z-THREADED 05X100MM CTS02

## (undated) DEVICE — LINEN TOWEL PACK X5 5464

## (undated) RX ORDER — ONDANSETRON 2 MG/ML
INJECTION INTRAMUSCULAR; INTRAVENOUS
Status: DISPENSED
Start: 2018-10-25

## (undated) RX ORDER — FENTANYL CITRATE 50 UG/ML
INJECTION, SOLUTION INTRAMUSCULAR; INTRAVENOUS
Status: DISPENSED
Start: 2018-10-25

## (undated) RX ORDER — PROPOFOL 10 MG/ML
INJECTION, EMULSION INTRAVENOUS
Status: DISPENSED
Start: 2018-10-25

## (undated) RX ORDER — HYDROMORPHONE HYDROCHLORIDE 1 MG/ML
INJECTION, SOLUTION INTRAMUSCULAR; INTRAVENOUS; SUBCUTANEOUS
Status: DISPENSED
Start: 2018-10-25

## (undated) RX ORDER — DEXAMETHASONE SODIUM PHOSPHATE 4 MG/ML
INJECTION, SOLUTION INTRA-ARTICULAR; INTRALESIONAL; INTRAMUSCULAR; INTRAVENOUS; SOFT TISSUE
Status: DISPENSED
Start: 2018-10-25

## (undated) RX ORDER — LABETALOL HYDROCHLORIDE 5 MG/ML
INJECTION, SOLUTION INTRAVENOUS
Status: DISPENSED
Start: 2018-10-25